# Patient Record
Sex: MALE | Race: WHITE | Employment: OTHER | URBAN - NONMETROPOLITAN AREA
[De-identification: names, ages, dates, MRNs, and addresses within clinical notes are randomized per-mention and may not be internally consistent; named-entity substitution may affect disease eponyms.]

---

## 2019-02-22 ENCOUNTER — OPTICAL OFFICE (OUTPATIENT)
Dept: URBAN - NONMETROPOLITAN AREA CLINIC 4 | Facility: CLINIC | Age: 49
Setting detail: OPHTHALMOLOGY
End: 2019-02-22
Payer: COMMERCIAL

## 2019-02-22 ENCOUNTER — DOCTOR'S OFFICE (OUTPATIENT)
Dept: URBAN - NONMETROPOLITAN AREA CLINIC 1 | Facility: CLINIC | Age: 49
Setting detail: OPHTHALMOLOGY
End: 2019-02-22
Payer: COMMERCIAL

## 2019-02-22 DIAGNOSIS — H52.03: ICD-10-CM

## 2019-02-22 DIAGNOSIS — H52.223: ICD-10-CM

## 2019-02-22 DIAGNOSIS — H52.4: ICD-10-CM

## 2019-02-22 DIAGNOSIS — H25.13: ICD-10-CM

## 2019-02-22 PROCEDURE — V2202 LENS SPHERE BIFOCAL 7.12-20.: HCPCS | Performed by: OPTOMETRIST

## 2019-02-22 PROCEDURE — V2784 LENS POLYCARB OR EQUAL: HCPCS | Performed by: OPTOMETRIST

## 2019-02-22 PROCEDURE — 92004 COMPRE OPH EXAM NEW PT 1/>: CPT | Performed by: OPTOMETRIST

## 2019-02-22 PROCEDURE — 92015 DETERMINE REFRACTIVE STATE: CPT | Performed by: OPTOMETRIST

## 2019-02-22 PROCEDURE — V2203 LENS SPHCYL BIFOCAL 4.00D/.1: HCPCS | Performed by: OPTOMETRIST

## 2019-02-22 PROCEDURE — V2020 VISION SVCS FRAMES PURCHASES: HCPCS | Performed by: OPTOMETRIST

## 2019-02-22 ASSESSMENT — REFRACTION_CURRENTRX
OD_OVR_VA: 20/
OS_VPRISM_DIRECTION: BF
OD_VPRISM_DIRECTION: BF
OS_OVR_VA: 20/
OS_SPHERE: +1.00
OD_OVR_VA: 20/
OS_OVR_VA: 20/
OS_OVR_VA: 20/
OS_CYLINDER: -0.50
OD_ADD: +1.75
OD_SPHERE: +1.25
OS_AXIS: 087
OD_AXIS: 180
OD_OVR_VA: 20/
OD_CYLINDER: 0.00
OS_ADD: +1.75

## 2019-02-22 ASSESSMENT — REFRACTION_AUTOREFRACTION
OS_AXIS: 082
OD_AXIS: 107
OD_CYLINDER: -0.25
OD_SPHERE: +2.00
OS_CYLINDER: -1.00
OS_SPHERE: +1.75

## 2019-02-22 ASSESSMENT — VISUAL ACUITY
OD_BCVA: 20/50
OS_BCVA: 20/50

## 2019-02-22 ASSESSMENT — REFRACTION_MANIFEST
OD_CYLINDER: -0.25
OD_VA3: 20/
OD_AXIS: 105
OD_VA2: 20/20
OS_AXIS: 090
OS_VA3: 20/
OS_ADD: +2.25
OU_VA: 20/
OS_CYLINDER: -0.75
OD_VA1: 20/25
OU_VA: 20/
OS_VA2: 20/
OS_VA2: 20/20
OS_VA1: 20/25
OD_VA3: 20/
OD_VA2: 20/
OD_VA1: 20/
OD_SPHERE: +1.75
OS_SPHERE: +1.50
OS_VA1: 20/
OS_VA3: 20/
OD_ADD: +2.25

## 2019-02-22 ASSESSMENT — SPHEQUIV_DERIVED
OD_SPHEQUIV: 1.875
OS_SPHEQUIV: 1.125
OD_SPHEQUIV: 1.625
OS_SPHEQUIV: 1.25

## 2020-01-22 ENCOUNTER — HOSPITAL ENCOUNTER (EMERGENCY)
Facility: HOSPITAL | Age: 50
Discharge: HOME | End: 2020-01-22
Attending: EMERGENCY MEDICINE | Admitting: EMERGENCY MEDICINE
Payer: COMMERCIAL

## 2020-01-22 ENCOUNTER — APPOINTMENT (EMERGENCY)
Dept: CT IMAGING | Facility: HOSPITAL | Age: 50
End: 2020-01-22
Payer: COMMERCIAL

## 2020-01-22 VITALS
OXYGEN SATURATION: 98 % | HEART RATE: 67 BPM | RESPIRATION RATE: 21 BRPM | DIASTOLIC BLOOD PRESSURE: 82 MMHG | WEIGHT: 225.09 LBS | SYSTOLIC BLOOD PRESSURE: 106 MMHG | TEMPERATURE: 98.6 F

## 2020-01-22 DIAGNOSIS — S16.1XXA ACUTE STRAIN OF NECK MUSCLE, INITIAL ENCOUNTER: Primary | ICD-10-CM

## 2020-01-22 PROCEDURE — 72125 CT NECK SPINE W/O DYE: CPT

## 2020-01-22 PROCEDURE — 99284 EMERGENCY DEPT VISIT MOD MDM: CPT | Performed by: EMERGENCY MEDICINE

## 2020-01-22 PROCEDURE — 96372 THER/PROPH/DIAG INJ SC/IM: CPT

## 2020-01-22 PROCEDURE — 99284 EMERGENCY DEPT VISIT MOD MDM: CPT

## 2020-01-22 RX ORDER — OXYCODONE HYDROCHLORIDE AND ACETAMINOPHEN 5; 325 MG/1; MG/1
1 TABLET ORAL EVERY 6 HOURS PRN
Qty: 12 TABLET | Refills: 0 | Status: SHIPPED | OUTPATIENT
Start: 2020-01-22 | End: 2020-01-25

## 2020-01-22 RX ORDER — HYDROMORPHONE HCL/PF 1 MG/ML
1 SYRINGE (ML) INJECTION ONCE
Status: COMPLETED | OUTPATIENT
Start: 2020-01-22 | End: 2020-01-22

## 2020-01-22 RX ORDER — OXYCODONE HYDROCHLORIDE AND ACETAMINOPHEN 5; 325 MG/1; MG/1
1 TABLET ORAL ONCE
Status: COMPLETED | OUTPATIENT
Start: 2020-01-22 | End: 2020-01-22

## 2020-01-22 RX ORDER — HYDROMORPHONE HCL/PF 1 MG/ML
1 SYRINGE (ML) INJECTION ONCE
Status: DISCONTINUED | OUTPATIENT
Start: 2020-01-22 | End: 2020-01-22

## 2020-01-22 RX ORDER — KETOROLAC TROMETHAMINE 30 MG/ML
15 INJECTION, SOLUTION INTRAMUSCULAR; INTRAVENOUS ONCE
Status: COMPLETED | OUTPATIENT
Start: 2020-01-22 | End: 2020-01-22

## 2020-01-22 RX ADMIN — HYDROMORPHONE HYDROCHLORIDE 1 MG: 1 INJECTION, SOLUTION INTRAMUSCULAR; INTRAVENOUS; SUBCUTANEOUS at 10:36

## 2020-01-22 RX ADMIN — HYDROMORPHONE HYDROCHLORIDE 1 MG: 1 INJECTION, SOLUTION INTRAMUSCULAR; INTRAVENOUS; SUBCUTANEOUS at 09:46

## 2020-01-22 RX ADMIN — KETOROLAC TROMETHAMINE 15 MG: 30 INJECTION, SOLUTION INTRAMUSCULAR at 09:47

## 2020-01-22 RX ADMIN — OXYCODONE HYDROCHLORIDE AND ACETAMINOPHEN 1 TABLET: 5; 325 TABLET ORAL at 11:54

## 2020-01-22 NOTE — ED PROVIDER NOTES
History  Chief Complaint   Patient presents with    Back Pain     Patient was moving bed last night and suddenly felt a "shooting pain" in back, hx of back surgery and fxs with stimulator placement, patient report numbness in left hand but denies numbness/ tingling elsewhere or changes in bowel/bladder habits     Patient was moving his bed last night when he felt a pull and pop in his neck  Patient has a TENS stimulator secondary to chronic neck pain  Patient is concerned that he may have pulled some wires loose from his neck  Also reports that he has pain shooting down his back and in addition he reports that he had some numbness/tingling in his left hand  Patient is right-hand dominant  Patient reports that he has not had that type of sensation in the past   He does however described a circumferential and it does not appear in a dermatomal distribution and extends from the mid to distal portion of his left forearm down completely into the hand  Patient has had no bowel or bladder incontinence any complains of no difficulty with ambulation other than discomfort  History provided by:  Patient and EMS personnel  Neck Pain   Pain location:  Generalized neck  Quality:  Aching  Pain radiates to:  L arm  Pain severity:  Severe  Pain is:  Unable to specify  Onset quality:  Sudden  Timing:  Constant  Progression:  Unchanged  Chronicity:  Recurrent  Context: lifting a heavy object    Context: not fall    Relieved by:  Nothing  Worsened by:  Position  Ineffective treatments:  None tried  Associated symptoms: numbness and tingling    Associated symptoms: no bladder incontinence, no bowel incontinence, no chest pain and no fever    Risk factors: hx of spinal trauma    Risk factors: no hx of head and neck radiation and no hx of osteoporosis        None       History reviewed  No pertinent past medical history  Past Surgical History:   Procedure Laterality Date    BACK SURGERY         History reviewed   No pertinent family history  I have reviewed and agree with the history as documented  Social History     Tobacco Use    Smoking status: Current Every Day Smoker     Packs/day: 0 50    Smokeless tobacco: Never Used   Substance Use Topics    Alcohol use: Never     Frequency: Never    Drug use: Not on file        Review of Systems   Constitutional: Negative  Negative for fever  HENT: Negative  Respiratory: Negative  Cardiovascular: Negative  Negative for chest pain  Gastrointestinal: Negative for blood in stool, bowel incontinence, constipation, diarrhea and nausea  Genitourinary: Negative  Negative for bladder incontinence, decreased urine volume, difficulty urinating, enuresis, flank pain, frequency, hematuria and urgency  Musculoskeletal: Positive for neck pain  Negative for arthralgias and myalgias  Neurological: Positive for tingling and numbness  Physical Exam  Physical Exam   Constitutional: He is oriented to person, place, and time  He appears well-developed and well-nourished  HENT:   Head: Normocephalic and atraumatic  Eyes: Pupils are equal, round, and reactive to light  EOM are normal    Neck: Neck supple  Muscular tenderness present  Decreased range of motion present  Pain with exam is disproportionate  At this point the lightest touch seems to elicit a severe response  Pulmonary/Chest: Effort normal and breath sounds normal  No stridor  No respiratory distress  He has no wheezes  He has no rhonchi  He has no rales  Abdominal: Soft  Normal appearance and bowel sounds are normal  He exhibits no mass  There is no hepatosplenomegaly  There is no tenderness  There is no rigidity, no rebound, no guarding and no CVA tenderness  No hernia  Lymphadenopathy:     He has no cervical adenopathy  Neurological: He is alert and oriented to person, place, and time  Skin: Skin is warm and dry  No rash noted  No pallor  Psychiatric: He has a normal mood and affect   His behavior is normal    Nursing note and vitals reviewed  Vital Signs  ED Triage Vitals [01/22/20 0927]   Temperature Pulse Respirations Blood Pressure SpO2   98 1 °F (36 7 °C) 79 20 108/83 97 %      Temp Source Heart Rate Source Patient Position - Orthostatic VS BP Location FiO2 (%)   Oral Monitor Lying Right arm --      Pain Score       --           Vitals:    01/22/20 0927 01/22/20 1100 01/22/20 1155   BP: 108/83 118/87 106/82   Pulse: 79 64 67   Patient Position - Orthostatic VS: Lying Sitting Sitting         Visual Acuity      ED Medications  Medications   HYDROmorphone (DILAUDID) injection 1 mg (1 mg Intramuscular Given 1/22/20 0946)   ketorolac (TORADOL) injection 15 mg (15 mg Intramuscular Given 1/22/20 0947)   HYDROmorphone (DILAUDID) injection 1 mg (1 mg Intramuscular Given 1/22/20 1036)   oxyCODONE-acetaminophen (PERCOCET) 5-325 mg per tablet 1 tablet (1 tablet Oral Given 1/22/20 1154)       Diagnostic Studies  Results Reviewed     None                 CT spine cervical without contrast   Final Result by Fior Borrero MD (01/22 1029)      No cervical spine fracture or traumatic malalignment  Workstation performed: [id number]                    Procedures  Procedures         ED Course  ED Course as of Jan 22 1232   Wed Jan 22, 2020   1129 No fracture identified  No traumatic malalignment and stimulators in place  MDM  Number of Diagnoses or Management Options  Acute strain of neck muscle, initial encounter:      Amount and/or Complexity of Data Reviewed  Tests in the radiology section of CPT®: ordered and reviewed          Disposition  Final diagnoses:   Acute strain of neck muscle, initial encounter     Time reflects when diagnosis was documented in both MDM as applicable and the Disposition within this note     Time User Action Codes Description Comment    1/22/2020 11:43 AM Denise Hays Add [S16  1XXA] Acute strain of neck muscle, initial encounter ED Disposition     ED Disposition Condition Date/Time Comment    Discharge Stable Wed Jan 22, 2020 11:41 AM Taya Hoover discharge to home/self care  Follow-up Information     Follow up With Specialties Details Why [address] [facility],[address] [place] [address],  Family Medicine   [address] [place] [address] Raymondnhflavio  [phone]            Discharge Medication List as of 1/22/2020 11:45 AM      START taking these medications    Details   oxyCODONE-acetaminophen (PERCOCET) 5-325 mg per tablet Take 1 tablet by mouth every 6 (six) hours as needed for moderate pain for up to 3 daysMax Daily Amount: 4 tablets, Starting Wed 1/22/2020, Until Sat 1/25/2020, Normal           No discharge procedures on file      ED Provider  Electronically Signed by           Dash Turner DO  01/22/20 2079

## 2020-02-24 ENCOUNTER — DOCTOR'S OFFICE (OUTPATIENT)
Dept: URBAN - NONMETROPOLITAN AREA CLINIC 1 | Facility: CLINIC | Age: 50
Setting detail: OPHTHALMOLOGY
End: 2020-02-24
Payer: COMMERCIAL

## 2020-02-24 ENCOUNTER — OPTICAL OFFICE (OUTPATIENT)
Dept: URBAN - NONMETROPOLITAN AREA CLINIC 4 | Facility: CLINIC | Age: 50
Setting detail: OPHTHALMOLOGY
End: 2020-02-24
Payer: COMMERCIAL

## 2020-02-24 DIAGNOSIS — H52.03: ICD-10-CM

## 2020-02-24 DIAGNOSIS — H25.13: ICD-10-CM

## 2020-02-24 DIAGNOSIS — H52.223: ICD-10-CM

## 2020-02-24 DIAGNOSIS — H52.4: ICD-10-CM

## 2020-02-24 PROCEDURE — 92015 DETERMINE REFRACTIVE STATE: CPT | Performed by: OPTOMETRIST

## 2020-02-24 PROCEDURE — V2202 LENS SPHERE BIFOCAL 7.12-20.: HCPCS | Performed by: OPTOMETRIST

## 2020-02-24 PROCEDURE — 92014 COMPRE OPH EXAM EST PT 1/>: CPT | Performed by: OPTOMETRIST

## 2020-02-24 PROCEDURE — V2020 VISION SVCS FRAMES PURCHASES: HCPCS | Performed by: OPTOMETRIST

## 2020-02-24 PROCEDURE — V2203 LENS SPHCYL BIFOCAL 4.00D/.1: HCPCS | Performed by: OPTOMETRIST

## 2020-02-24 PROCEDURE — V2784 LENS POLYCARB OR EQUAL: HCPCS | Performed by: OPTOMETRIST

## 2020-02-24 ASSESSMENT — REFRACTION_AUTOREFRACTION
OS_AXIS: 084
OD_CYLINDER: -0.50
OD_AXIS: 107
OS_SPHERE: +2.00
OD_SPHERE: +2.00
OS_CYLINDER: -1.25

## 2020-02-24 ASSESSMENT — VISUAL ACUITY
OS_BCVA: 20/40
OD_BCVA: 20/50-1

## 2020-02-24 ASSESSMENT — REFRACTION_CURRENTRX
OD_CYLINDER: 0.00
OD_OVR_VA: 20/
OS_AXIS: 090
OS_CYLINDER: -0.50
OS_SPHERE: +1.00
OS_OVR_VA: 20/
OD_ADD: +1.75
OD_AXIS: 180
OS_ADD: +1.75
OS_VPRISM_DIRECTION: BF
OD_SPHERE: +1.25
OD_VPRISM_DIRECTION: BF

## 2020-02-24 ASSESSMENT — REFRACTION_MANIFEST
OS_VA1: 20/25
OS_CYLINDER: -1.00
OS_SPHERE: +1.50
OS_AXIS: 090
OD_CYLINDER: -0.50
OD_VA1: 20/25
OD_VA2: 20/20
OD_ADD: +2.25
OS_VA2: 20/20
OS_ADD: +2.25
OD_AXIS: 105
OD_SPHERE: +1.75

## 2020-02-24 ASSESSMENT — CONFRONTATIONAL VISUAL FIELD TEST (CVF)
OD_FINDINGS: FULL
OS_FINDINGS: FULL

## 2020-02-24 ASSESSMENT — SPHEQUIV_DERIVED
OD_SPHEQUIV: 1.75
OD_SPHEQUIV: 1.5
OS_SPHEQUIV: 1.375
OS_SPHEQUIV: 1

## 2022-08-11 DIAGNOSIS — R22.2 LOCALIZED SWELLING, MASS AND LUMP, TRUNK: ICD-10-CM

## 2022-08-11 DIAGNOSIS — K42.9 UMBILICAL HERNIA WITHOUT OBSTRUCTION OR GANGRENE: ICD-10-CM

## 2022-08-13 ENCOUNTER — HOSPITAL ENCOUNTER (OUTPATIENT)
Dept: ULTRASOUND IMAGING | Facility: HOSPITAL | Age: 52
Discharge: HOME | End: 2022-08-13
Payer: COMMERCIAL

## 2022-08-13 DIAGNOSIS — R22.2 LOCALIZED SWELLING, MASS AND LUMP, TRUNK: ICD-10-CM

## 2022-08-13 DIAGNOSIS — K42.9 UMBILICAL HERNIA WITHOUT OBSTRUCTION OR GANGRENE: ICD-10-CM

## 2022-08-13 PROCEDURE — 76705 ECHO EXAM OF ABDOMEN: CPT

## 2022-08-23 ENCOUNTER — DOCTOR'S OFFICE (OUTPATIENT)
Dept: URBAN - NONMETROPOLITAN AREA CLINIC 1 | Facility: CLINIC | Age: 52
Setting detail: OPHTHALMOLOGY
End: 2022-08-23
Payer: COMMERCIAL

## 2022-08-23 DIAGNOSIS — H25.13: ICD-10-CM

## 2022-08-23 DIAGNOSIS — H52.4: ICD-10-CM

## 2022-08-23 DIAGNOSIS — H52.03: ICD-10-CM

## 2022-08-23 PROCEDURE — 92015 DETERMINE REFRACTIVE STATE: CPT | Performed by: OPTOMETRIST

## 2022-08-23 PROCEDURE — 92014 COMPRE OPH EXAM EST PT 1/>: CPT | Performed by: OPTOMETRIST

## 2022-08-23 ASSESSMENT — VISUAL ACUITY
OD_BCVA: 20/50
OS_BCVA: 20/50

## 2022-08-23 ASSESSMENT — SPHEQUIV_DERIVED
OD_SPHEQUIV: 1.75
OD_SPHEQUIV: 1.75
OS_SPHEQUIV: 1.375
OS_SPHEQUIV: 1.375

## 2022-08-23 ASSESSMENT — REFRACTION_CURRENTRX
OS_OVR_VA: 20/
OS_SPHERE: +1.50
OS_ADD: +2.25
OS_CYLINDER: -1.00
OS_AXIS: 091
OD_CYLINDER: -0.50
OD_SPHERE: +1.75
OD_OVR_VA: 20/
OD_AXIS: 105
OD_ADD: +2.25

## 2022-08-23 ASSESSMENT — REFRACTION_AUTOREFRACTION
OS_SPHERE: +2.00
OD_CYLINDER: -0.50
OD_SPHERE: +2.00
OD_AXIS: 087
OS_AXIS: 086
OS_CYLINDER: -1.25

## 2022-08-23 ASSESSMENT — REFRACTION_MANIFEST
OD_AXIS: 090
OD_VA2: 20/25-2
OS_CYLINDER: -1.25
OS_ADD: +2.50
OS_SPHERE: +2.00
OD_CYLINDER: -0.50
OD_VA1: 20/25-2
OD_SPHERE: +2.00
OD_ADD: +2.50
OS_AXIS: 085
OS_VA2: 20/30-2
OS_VA1: 20/30-2

## 2022-08-23 ASSESSMENT — TONOMETRY
OD_IOP_MMHG: 14
OS_IOP_MMHG: 14

## 2022-08-23 ASSESSMENT — CONFRONTATIONAL VISUAL FIELD TEST (CVF)
OS_FINDINGS: FULL
OD_FINDINGS: FULL

## 2022-09-01 ENCOUNTER — OPTICAL OFFICE (OUTPATIENT)
Dept: URBAN - NONMETROPOLITAN AREA CLINIC 4 | Facility: CLINIC | Age: 52
Setting detail: OPHTHALMOLOGY
End: 2022-09-01
Payer: COMMERCIAL

## 2022-09-01 DIAGNOSIS — H52.223: ICD-10-CM

## 2022-09-01 PROCEDURE — V2784 LENS POLYCARB OR EQUAL: HCPCS | Performed by: OPTOMETRIST

## 2022-09-01 PROCEDURE — V2203 LENS SPHCYL BIFOCAL 4.00D/.1: HCPCS | Performed by: OPTOMETRIST

## 2022-09-01 PROCEDURE — V2202 LENS SPHERE BIFOCAL 7.12-20.: HCPCS | Performed by: OPTOMETRIST

## 2022-09-01 PROCEDURE — V2744 TINT PHOTOCHROMATIC LENS/ES: HCPCS | Performed by: OPTOMETRIST

## 2022-09-01 PROCEDURE — V2020 VISION SVCS FRAMES PURCHASES: HCPCS | Performed by: OPTOMETRIST

## 2022-09-13 ENCOUNTER — HOSPITAL ENCOUNTER (OUTPATIENT)
Dept: RADIOLOGY | Facility: HOSPITAL | Age: 52
Discharge: HOME | End: 2022-09-13
Attending: SURGERY
Payer: COMMERCIAL

## 2022-09-13 DIAGNOSIS — D17.1 LIPOMA OF TORSO: ICD-10-CM

## 2022-09-13 DIAGNOSIS — K42.9 UMBILICAL HERNIA WITHOUT OBSTRUCTION AND WITHOUT GANGRENE: ICD-10-CM

## 2022-09-13 PROCEDURE — 71046 X-RAY EXAM CHEST 2 VIEWS: CPT

## 2022-09-28 DIAGNOSIS — T65.291A TOXIC EFFECT OF TOBACCO AND NICOTINE, ACCIDENTAL OR UNINTENTIONAL, INITIAL ENCOUNTER: Primary | ICD-10-CM

## 2022-09-28 RX ORDER — ASPIRIN 81 MG/1
81 TABLET, CHEWABLE ORAL DAILY
COMMUNITY

## 2022-09-28 RX ORDER — FLECAINIDE ACETATE 50 MG/1
50 TABLET ORAL 2 TIMES DAILY
COMMUNITY

## 2022-09-28 RX ORDER — METOPROLOL SUCCINATE 50 MG/1
50 TABLET, EXTENDED RELEASE ORAL DAILY
COMMUNITY

## 2022-09-28 RX ORDER — COVID-19 ANTIGEN TEST
1 KIT MISCELLANEOUS AS NEEDED
COMMUNITY
End: 2022-10-05

## 2022-09-28 RX ORDER — ROSUVASTATIN CALCIUM 40 MG/1
40 TABLET, COATED ORAL DAILY
COMMUNITY

## 2022-09-28 RX ORDER — CALCIUM CARBONATE 750 MG/1
1 TABLET, CHEWABLE ORAL DAILY
COMMUNITY

## 2022-09-28 RX ORDER — EZETIMIBE 10 MG/1
10 TABLET ORAL DAILY
COMMUNITY

## 2022-09-28 NOTE — PRE-PROCEDURE INSTRUCTIONS
Pre-Surgery Instructions:   Medication Instructions    aspirin 81 mg chewable tablet Hold day of surgery   ezetimibe (ZETIA) 10 mg tablet Take day of surgery   flecainide (TAMBOCOR) 50 mg tablet Take day of surgery   metoprolol succinate (TOPROL-XL) 50 mg 24 hr tablet Take day of surgery   Naproxen Sodium (Aleve) 220 MG CAPS Stop taking 3 days prior to surgery   rosuvastatin (CRESTOR) 40 MG tablet Take day of surgery  See above    Pt was instructed to take the am meds with a small sip of water

## 2022-09-29 ENCOUNTER — ANESTHESIA EVENT (OUTPATIENT)
Dept: PERIOP | Facility: HOSPITAL | Age: 52
End: 2022-09-29
Payer: COMMERCIAL

## 2022-10-03 NOTE — ANESTHESIA PREPROCEDURE EVALUATION
Procedure:  ROBOTIC ASSISTED LAPAROSCOPIC UMBILICAL HERNIA REPAIR WITH MESH (N/A Abdomen)  OPEN UMBILICAL HERNIA REPAIR (N/A Abdomen)  ABDOMINAL WALL LIPOMA EXCISION (N/A Abdomen)    ECG: SR with PVCS and QT wnl    Prior Ziopatch showed 10% afib burden with RVR did not wish to follow up with EP - Seen by cardiologist, ECG showed QT wnl and no further w/u needed prior to surgery   Afib - flecainide and metoprolol  HLD - statin  Albuterol  SCS  Hx cervical spine fusion     States he can walk 6-7 blocks until he has some dyspnea but denies chest pain or pressure  States he will randomly have chest pain not associated with exertion but at times will feel heart racing  Last episode was last night  He did take his metoprolol and flecainide this am   I did discuss with him the risk and possibility of having Afib RVR intraop and the potential for: aborted procedure if unable to establish rate control, secondly possibly requiring admission post operative if rate not controlled and the potential for stress on his heart secondary to rapid rate  He otherwise Denies the following: asthma, COPD, KADI, stroke/TIA, seizure      Physical Exam    Airway    Mallampati score: II  TM Distance: >3 FB       Dental   upper dentures,     Cardiovascular      Pulmonary      Other Findings        Anesthesia Plan  ASA Score- 3     Anesthesia Type- general with ASA Monitors  Additional Monitors:   Airway Plan: ETT  Plan Factors-Exercise tolerance (METS): >4 METS  Chart reviewed  EKG reviewed  Existing labs reviewed  Patient summary reviewed  Patient is a current smoker  Obstructive sleep apnea risk education given perioperatively  Induction- intravenous  Postoperative Plan-     Informed Consent- Anesthetic plan and risks discussed with patient  I personally reviewed this patient with the CRNA  Discussed and agreed on the Anesthesia Plan with the CRNA  Marika Chawla

## 2022-10-04 ENCOUNTER — HOSPITAL ENCOUNTER (OUTPATIENT)
Facility: HOSPITAL | Age: 52
Setting detail: OUTPATIENT SURGERY
Discharge: HOME | End: 2022-10-05
Attending: SURGERY | Admitting: SURGERY
Payer: COMMERCIAL

## 2022-10-04 ENCOUNTER — ANESTHESIA (OUTPATIENT)
Dept: PERIOP | Facility: HOSPITAL | Age: 52
End: 2022-10-04
Payer: COMMERCIAL

## 2022-10-04 DIAGNOSIS — D17.1 LIPOMA OF ABDOMINAL WALL: ICD-10-CM

## 2022-10-04 DIAGNOSIS — K42.9 UMBILICAL HERNIA WITHOUT OBSTRUCTION AND WITHOUT GANGRENE: ICD-10-CM

## 2022-10-04 DIAGNOSIS — K42.9 UMBILICAL HERNIA WITHOUT OBSTRUCTION OR GANGRENE: Primary | ICD-10-CM

## 2022-10-04 DIAGNOSIS — I49.3 PVC (PREMATURE VENTRICULAR CONTRACTION): ICD-10-CM

## 2022-10-04 LAB — GLUCOSE SERPL-MCNC: 106 MG/DL (ref 65–140)

## 2022-10-04 PROCEDURE — 82948 REAGENT STRIP/BLOOD GLUCOSE: CPT

## 2022-10-04 PROCEDURE — 49652 PR LAP, VENTRAL HERNIA REPAIR,REDUCIBLE: CPT | Performed by: PHYSICIAN ASSISTANT

## 2022-10-04 PROCEDURE — 93005 ELECTROCARDIOGRAM TRACING: CPT

## 2022-10-04 PROCEDURE — 88304 TISSUE EXAM BY PATHOLOGIST: CPT | Performed by: PATHOLOGY

## 2022-10-04 PROCEDURE — C1781 MESH (IMPLANTABLE): HCPCS | Performed by: SURGERY

## 2022-10-04 PROCEDURE — 11406 EXC TR-EXT B9+MARG >4.0 CM: CPT | Performed by: PHYSICIAN ASSISTANT

## 2022-10-04 DEVICE — VENTRALIGHT ST MESH WITH ECHO 2 POSITIONING SYSTEM 11 CM (4.5)" CIRCLE
Type: IMPLANTABLE DEVICE | Status: FUNCTIONAL
Brand: VENTRALIGHT ST MESH WITH ECHO 2 POSITIONING SYSTEM

## 2022-10-04 RX ORDER — EPHEDRINE SULFATE 50 MG/ML
INJECTION INTRAVENOUS AS NEEDED
Status: DISCONTINUED | OUTPATIENT
Start: 2022-10-04 | End: 2022-10-04

## 2022-10-04 RX ORDER — ROCURONIUM BROMIDE 10 MG/ML
INJECTION, SOLUTION INTRAVENOUS AS NEEDED
Status: DISCONTINUED | OUTPATIENT
Start: 2022-10-04 | End: 2022-10-04

## 2022-10-04 RX ORDER — ONDANSETRON 2 MG/ML
4 INJECTION INTRAMUSCULAR; INTRAVENOUS ONCE AS NEEDED
Status: DISCONTINUED | OUTPATIENT
Start: 2022-10-04 | End: 2022-10-04

## 2022-10-04 RX ORDER — SODIUM CHLORIDE, SODIUM LACTATE, POTASSIUM CHLORIDE, CALCIUM CHLORIDE 600; 310; 30; 20 MG/100ML; MG/100ML; MG/100ML; MG/100ML
INJECTION, SOLUTION INTRAVENOUS CONTINUOUS PRN
Status: DISCONTINUED | OUTPATIENT
Start: 2022-10-04 | End: 2022-10-04

## 2022-10-04 RX ORDER — OXYCODONE HYDROCHLORIDE AND ACETAMINOPHEN 5; 325 MG/1; MG/1
1 TABLET ORAL EVERY 4 HOURS PRN
Status: DISCONTINUED | OUTPATIENT
Start: 2022-10-04 | End: 2022-10-05 | Stop reason: HOSPADM

## 2022-10-04 RX ORDER — ATORVASTATIN CALCIUM 40 MG/1
40 TABLET, FILM COATED ORAL
Status: DISCONTINUED | OUTPATIENT
Start: 2022-10-04 | End: 2022-10-05 | Stop reason: HOSPADM

## 2022-10-04 RX ORDER — CEFAZOLIN SODIUM 2 G/50ML
2000 SOLUTION INTRAVENOUS
Status: COMPLETED | OUTPATIENT
Start: 2022-10-04 | End: 2022-10-04

## 2022-10-04 RX ORDER — MAGNESIUM SULFATE 1 G/100ML
1 INJECTION INTRAVENOUS ONCE
Status: COMPLETED | OUTPATIENT
Start: 2022-10-04 | End: 2022-10-04

## 2022-10-04 RX ORDER — EZETIMIBE 10 MG/1
10 TABLET ORAL DAILY
Status: DISCONTINUED | OUTPATIENT
Start: 2022-10-05 | End: 2022-10-05 | Stop reason: HOSPADM

## 2022-10-04 RX ORDER — LIDOCAINE HYDROCHLORIDE 10 MG/ML
INJECTION, SOLUTION EPIDURAL; INFILTRATION; INTRACAUDAL; PERINEURAL AS NEEDED
Status: DISCONTINUED | OUTPATIENT
Start: 2022-10-04 | End: 2022-10-04

## 2022-10-04 RX ORDER — LIDOCAINE HYDROCHLORIDE AND EPINEPHRINE 10; 10 MG/ML; UG/ML
INJECTION, SOLUTION INFILTRATION; PERINEURAL AS NEEDED
Status: DISCONTINUED | OUTPATIENT
Start: 2022-10-04 | End: 2022-10-04 | Stop reason: HOSPADM

## 2022-10-04 RX ORDER — DEXAMETHASONE SODIUM PHOSPHATE 10 MG/ML
INJECTION, SOLUTION INTRAMUSCULAR; INTRAVENOUS AS NEEDED
Status: DISCONTINUED | OUTPATIENT
Start: 2022-10-04 | End: 2022-10-04

## 2022-10-04 RX ORDER — ACETAMINOPHEN 325 MG/1
650 TABLET ORAL EVERY 6 HOURS PRN
Status: DISCONTINUED | OUTPATIENT
Start: 2022-10-04 | End: 2022-10-05 | Stop reason: HOSPADM

## 2022-10-04 RX ORDER — FENTANYL CITRATE/PF 50 MCG/ML
25 SYRINGE (ML) INJECTION
Status: DISCONTINUED | OUTPATIENT
Start: 2022-10-04 | End: 2022-10-04

## 2022-10-04 RX ORDER — HYDROMORPHONE HCL/PF 1 MG/ML
0.5 SYRINGE (ML) INJECTION
Status: DISCONTINUED | OUTPATIENT
Start: 2022-10-04 | End: 2022-10-04

## 2022-10-04 RX ORDER — FENTANYL CITRATE 50 UG/ML
INJECTION, SOLUTION INTRAMUSCULAR; INTRAVENOUS AS NEEDED
Status: DISCONTINUED | OUTPATIENT
Start: 2022-10-04 | End: 2022-10-04

## 2022-10-04 RX ORDER — ONDANSETRON 2 MG/ML
INJECTION INTRAMUSCULAR; INTRAVENOUS AS NEEDED
Status: DISCONTINUED | OUTPATIENT
Start: 2022-10-04 | End: 2022-10-04

## 2022-10-04 RX ORDER — PROPOFOL 10 MG/ML
INJECTION, EMULSION INTRAVENOUS AS NEEDED
Status: DISCONTINUED | OUTPATIENT
Start: 2022-10-04 | End: 2022-10-04

## 2022-10-04 RX ORDER — MIDAZOLAM HYDROCHLORIDE 2 MG/2ML
INJECTION, SOLUTION INTRAMUSCULAR; INTRAVENOUS AS NEEDED
Status: DISCONTINUED | OUTPATIENT
Start: 2022-10-04 | End: 2022-10-04

## 2022-10-04 RX ORDER — DOCUSATE SODIUM 100 MG/1
100 CAPSULE, LIQUID FILLED ORAL 2 TIMES DAILY
Status: DISCONTINUED | OUTPATIENT
Start: 2022-10-04 | End: 2022-10-05 | Stop reason: HOSPADM

## 2022-10-04 RX ORDER — OXYCODONE HYDROCHLORIDE AND ACETAMINOPHEN 5; 325 MG/1; MG/1
2 TABLET ORAL EVERY 4 HOURS PRN
Status: DISCONTINUED | OUTPATIENT
Start: 2022-10-04 | End: 2022-10-05 | Stop reason: HOSPADM

## 2022-10-04 RX ORDER — KETOROLAC TROMETHAMINE 30 MG/ML
INJECTION, SOLUTION INTRAMUSCULAR; INTRAVENOUS AS NEEDED
Status: DISCONTINUED | OUTPATIENT
Start: 2022-10-04 | End: 2022-10-04

## 2022-10-04 RX ORDER — NICOTINE 21 MG/24HR
1 PATCH, TRANSDERMAL 24 HOURS TRANSDERMAL DAILY
Status: DISCONTINUED | OUTPATIENT
Start: 2022-10-05 | End: 2022-10-04 | Stop reason: SDUPTHER

## 2022-10-04 RX ORDER — METOPROLOL SUCCINATE 50 MG/1
50 TABLET, EXTENDED RELEASE ORAL DAILY
Status: DISCONTINUED | OUTPATIENT
Start: 2022-10-05 | End: 2022-10-05 | Stop reason: HOSPADM

## 2022-10-04 RX ORDER — ALBUTEROL SULFATE 2.5 MG/3ML
2.5 SOLUTION RESPIRATORY (INHALATION) ONCE AS NEEDED
Status: COMPLETED | OUTPATIENT
Start: 2022-10-04 | End: 2022-10-04

## 2022-10-04 RX ORDER — FLECAINIDE ACETATE 50 MG/1
50 TABLET ORAL 2 TIMES DAILY
Status: DISCONTINUED | OUTPATIENT
Start: 2022-10-04 | End: 2022-10-05 | Stop reason: HOSPADM

## 2022-10-04 RX ORDER — NICOTINE 21 MG/24HR
14 PATCH, TRANSDERMAL 24 HOURS TRANSDERMAL DAILY
Status: DISCONTINUED | OUTPATIENT
Start: 2022-10-05 | End: 2022-10-05 | Stop reason: HOSPADM

## 2022-10-04 RX ORDER — ONDANSETRON 2 MG/ML
4 INJECTION INTRAMUSCULAR; INTRAVENOUS EVERY 6 HOURS PRN
Status: DISCONTINUED | OUTPATIENT
Start: 2022-10-04 | End: 2022-10-05 | Stop reason: HOSPADM

## 2022-10-04 RX ADMIN — OXYCODONE HYDROCHLORIDE AND ACETAMINOPHEN 2 TABLET: 5; 325 TABLET ORAL at 17:15

## 2022-10-04 RX ADMIN — SODIUM CHLORIDE, SODIUM LACTATE, POTASSIUM CHLORIDE, AND CALCIUM CHLORIDE: .6; .31; .03; .02 INJECTION, SOLUTION INTRAVENOUS at 12:48

## 2022-10-04 RX ADMIN — MIDAZOLAM 2 MG: 1 INJECTION INTRAMUSCULAR; INTRAVENOUS at 12:48

## 2022-10-04 RX ADMIN — DOCUSATE SODIUM 100 MG: 100 CAPSULE ORAL at 17:15

## 2022-10-04 RX ADMIN — ROCURONIUM BROMIDE 10 MG: 10 INJECTION, SOLUTION INTRAVENOUS at 14:00

## 2022-10-04 RX ADMIN — Medication 100 MCG: at 13:03

## 2022-10-04 RX ADMIN — LIDOCAINE HYDROCHLORIDE 50 MG: 10 INJECTION, SOLUTION EPIDURAL; INFILTRATION; INTRACAUDAL at 12:53

## 2022-10-04 RX ADMIN — SODIUM CHLORIDE, SODIUM LACTATE, POTASSIUM CHLORIDE, AND CALCIUM CHLORIDE: .6; .31; .03; .02 INJECTION, SOLUTION INTRAVENOUS at 14:15

## 2022-10-04 RX ADMIN — DEXAMETHASONE SODIUM PHOSPHATE 8 MG: 10 INJECTION, SOLUTION INTRAMUSCULAR; INTRAVENOUS at 12:53

## 2022-10-04 RX ADMIN — OXYCODONE HYDROCHLORIDE AND ACETAMINOPHEN 1 TABLET: 5; 325 TABLET ORAL at 20:29

## 2022-10-04 RX ADMIN — Medication 100 MCG: at 13:25

## 2022-10-04 RX ADMIN — Medication 100 MCG: at 14:11

## 2022-10-04 RX ADMIN — SUGAMMADEX 200 MG: 100 INJECTION, SOLUTION INTRAVENOUS at 14:39

## 2022-10-04 RX ADMIN — FENTANYL CITRATE 50 MCG: 50 INJECTION INTRAMUSCULAR; INTRAVENOUS at 13:25

## 2022-10-04 RX ADMIN — FENTANYL CITRATE 50 MCG: 50 INJECTION INTRAMUSCULAR; INTRAVENOUS at 12:53

## 2022-10-04 RX ADMIN — Medication 100 MCG: at 13:09

## 2022-10-04 RX ADMIN — ALBUTEROL SULFATE 2.5 MG: 2.5 SOLUTION RESPIRATORY (INHALATION) at 14:57

## 2022-10-04 RX ADMIN — ROCURONIUM BROMIDE 50 MG: 10 INJECTION, SOLUTION INTRAVENOUS at 12:53

## 2022-10-04 RX ADMIN — KETOROLAC TROMETHAMINE 30 MG: 30 INJECTION, SOLUTION INTRAMUSCULAR at 14:29

## 2022-10-04 RX ADMIN — PROPOFOL 200 MG: 10 INJECTION, EMULSION INTRAVENOUS at 12:53

## 2022-10-04 RX ADMIN — EPHEDRINE SULFATE 10 MG: 50 INJECTION, SOLUTION INTRAVENOUS at 14:15

## 2022-10-04 RX ADMIN — Medication 200 MCG: at 13:58

## 2022-10-04 RX ADMIN — HYDROMORPHONE HYDROCHLORIDE 0.5 MG: 1 INJECTION, SOLUTION INTRAMUSCULAR; INTRAVENOUS; SUBCUTANEOUS at 15:54

## 2022-10-04 RX ADMIN — MAGNESIUM SULFATE HEPTAHYDRATE 1 G: 1 INJECTION, SOLUTION INTRAVENOUS at 16:16

## 2022-10-04 RX ADMIN — ONDANSETRON 4 MG: 2 INJECTION INTRAMUSCULAR; INTRAVENOUS at 13:09

## 2022-10-04 RX ADMIN — Medication 100 MCG: at 13:15

## 2022-10-04 RX ADMIN — CEFAZOLIN SODIUM 2000 MG: 2 SOLUTION INTRAVENOUS at 12:48

## 2022-10-04 RX ADMIN — EPHEDRINE SULFATE 10 MG: 50 INJECTION, SOLUTION INTRAVENOUS at 14:05

## 2022-10-04 RX ADMIN — FENTANYL CITRATE 25 MCG: 0.05 INJECTION, SOLUTION INTRAMUSCULAR; INTRAVENOUS at 15:39

## 2022-10-04 RX ADMIN — FENTANYL CITRATE 25 MCG: 0.05 INJECTION, SOLUTION INTRAMUSCULAR; INTRAVENOUS at 15:04

## 2022-10-04 RX ADMIN — FLECAINIDE ACETATE 50 MG: 50 TABLET ORAL at 17:15

## 2022-10-04 RX ADMIN — ROCURONIUM BROMIDE 20 MG: 10 INJECTION, SOLUTION INTRAVENOUS at 13:24

## 2022-10-04 NOTE — ANESTHESIA POSTPROCEDURE EVALUATION
Post-Op Assessment Note    CV Status:  Stable  Pain Score: 0    Pain management: adequate  Multimodal analgesia used between 6 hours prior to anesthesia start to PACU discharge    Mental Status:  Awake and agitated (moaning but shakes head "no" to pain)   Hydration Status:  Euvolemic   PONV Controlled:  Controlled   Airway Patency:  Patent   Two or more mitigation strategies used for obstructive sleep apnea   Post Op Vitals Reviewed: Yes      Staff: CRNA         No complications documented      BP   130/73   Temp 98 1 °F (36 7 °C) (10/04/22 1449)    Pulse 60 (10/04/22 1449)   Resp 18 (10/04/22 1449)    SpO2 99 % (10/04/22 1449)

## 2022-10-04 NOTE — QUICK NOTE
I was asked to come to the bedside to evaluate patient for concerns with PVCs  Patient underwent hernia repair today and post operatively had some PVCs  This is a known condition for patient  He is on BB as an OP as well as flecainide for underlying PAF  I did talk to the patient and he reports that currently his stomach hurts and he is nauseas but he has no active chest pain, sob or palpitations  On monitor he does have isolated PVCs but no evidence of VT  Admitting service may up titrate Toprol xl to 50 mg PO BID if having significant PVCs overnight if BP will allow  No further cardiac intervention is indicated at this time in the absence of further cardiac symptoms  Will tele check patient tomorrow per request of anesthesia team  I did ask anesthesia if they would like to perform a formal consult but they did decline this measure given this is a known condition for patient       Opal Speaker SANIYA

## 2022-10-04 NOTE — OP NOTE
OPERATIVE REPORT  PATIENT NAME: Henrik Anthony    :  1970  MRN: [id number]  Pt Location: [facility] OR ROOM 01    SURGERY DATE: 10/4/2022    Surgeon(s) and Role:     * Fausto Aranda DO - Primary     * Di Jacobson PA-C - Assisting     * Cait Dunaway [id number] - did not scrub  Preop Diagnosis:  Umbilical hernia without obstruction or gangrene [K42 9]  Lipoma of abdominal wall [D17 1]    Post-Op Diagnosis Codes:     * Umbilical hernia without obstruction or gangrene [K42 9]     * Lipoma of abdominal wall [D17 1] - measuring 4 cm x 3 cm x 1 cm    Procedure(s) (LRB):  ROBOTIC ASSISTED LAPAROSCOPIC UMBILICAL HERNIA REPAIR WITH MESH (N/A)  ABDOMINAL WALL LIPOMA EXCISION (N/A)    Specimen(s):  ID Type Source Tests Collected by Time Destination   1 : Abdominal wall lipoma Tissue Soft Tissue, Lipoma TISSUE EXAM Fausto Aranda DO 10/4/2022  2:29 PM        Estimated Blood Loss:   Minimal    Drains:  NG/OG/Enteral Tube Orogastric 18 Fr Center mouth (Active)   Number of days: 0       Anesthesia Type:   General    Operative Indications:  Umbilical hernia without obstruction or gangrene [K42 9]  Lipoma of abdominal wall [D17 1]      Operative Findings:  2 cm umbilical hernia defect containing pre-peritoneal fat  Left abdominal wall subcutaneous fatty mass  Complications:   None    Procedure and Technique:    Patient was taken to the operating room and correctly identified and the procedure was verified  They were placed in the supine position on the operating room table and general anesthesia was administered  The patient received preoperative antibiotics  The abdomen was prepped and draped in a sterile fashion  A time out was held and the above information was confirmed  The abdomen was entered in the left upper quadrant under Optiview technique with a 12 mm trocar and insufflated to 15 mmhg   An additional 12 mm trocar was placed in the left lateral abdomen and an 8 mm robotic trocar in the left lower abdomen triangulating toward the hernia defect  The robot was then docked according to [facility] [employer] specifications  Robotic scissors were used to dissect off the pre-peritoneal fat and the hernia was completely reduced  It contained pre-peritoneal fat  The hernia defect measured 2 cm  The fascia was then closed with 0 V-lock suture in a running fashion  A 11 cm [employer] Vetnralight ST mesh with echo positioning system was then placed into the abdomen and secured circumferentially with a 2-0 V-lock suture  The mesh lay flat without wrinkles  The needles and the positioning system were removed from the abdomen and the Robot was undocked  The 12 mm trocar sites were closed with 0 vicryl transfascial sutures  At this time attention was turned to the left lateral abdominal wall soft tissue mass  The skin was anestehtized with 1% lidocaine with epi and a 3 cm incision was made transversely over the palpable mass  The subcutaneous tissue was dissected down with electrocautery and the specimen was completely excised and measured 4 cm x 3 cm x 1 cm  The wound was irrigated with sterile saline  The skin was anesthetized and closed with 4-0 monocryl suture and covered with surgical glue  This marked the end of the procedure  All needle, instrument and sponge counts were correct times two  The patient awoke from anesthesia and was taken to recovery in stable condition        I was present for the entire procedure and A physician assistant was required during the procedure for retraction tissue handling,dissection and suturing    Patient Disposition:  PACU         SIGNATURE: Liliane Gomez DO  DATE: October 4, 2022  TIME: 2:33 PM

## 2022-10-04 NOTE — INTERVAL H&P NOTE
H&P reviewed  After examining the patient I find no changes in the patients condition since the H&P had been written      Vitals:    10/04/22 1019   BP: 121/75   Pulse: 69   Resp: 18   Temp: 98 2 °F (36 8 °C)   SpO2: 95%

## 2022-10-04 NOTE — DISCHARGE INSTRUCTIONS
Discharge Date:  10/4/2022  Procedure:  Procedure(s):  ROBOTIC ASSISTED LAPAROSCOPIC UMBILICAL HERNIA REPAIR WITH MESH  ABDOMINAL WALL LIPOMA EXCISION    Surgeon:  Sierra García, DO    Please contact Dr Vandana Lutz office at [phone] with any concerns or questions  The information below provides you with the instructions and the list of medications you need to be taking following discharge from the hospital   If you have any questions, please ask before leaving  Please carry this letter with you when you see your doctor in the clinic  If you have questions, you can reach us at the numbers above        Follow Up Appointments:  If not listed below, and one has not already been made for you, call the general surgery office at [phone]  Activity:  No lifting pushing or pulling greater than 10 lbs for 2 weeks post op or until instructed otherwise by provider at your post op visit  No lifting greater than 20 lbs until you are 6 weeks post op  Diet:  Regular diet as tolerated  Incision:   Your incisions are closed with absorbable suture and covered with surgical glue  You may shower tomorrow  Do not submerge fully in water until after for follow up office visit  Pain Control: You should apply ice for 20 minutes on then 20 minutes off to your incision(s) for pain relief for the first 72 hours after surgery  Ice will decrease the immediate postoperative inflammation  After the first 72 hours you should switch to a heating pad in the same time increments  The heat will continue to allow the swelling to go down and decrease your pain  We do not typically prescribe narcotic pain medication after your type of surgery  You can try to take these following types of medications:  Tylenol: You may take over the counter Tylenol (Acetaminophen) 650 mg every 6 hours as needed  The maximum daily dose of Tylenol is 3000 mg  Avoid other medications with Tylenol  Motrin/Ibuprofen:  You may take Motrin (Ibuprofen) up to 800 mg every 8 hours as needed  If your pain is not as severe you can use less than 800mg (each over the counter tablet is 200mg)  The maximum daily dose of Ibuprofen is 3200mg  Avoid other NSAID medications while taking Ibuprofen  Do NOT take Motrin/Ibuprofen or any other NSAID drugs while taking Toradol, or if you are on Coumadin or any other blood thinner, or were instructed to avoid ibuprofen previously  Constipation: It is normal not to have a bowel movement for up to 3 days after surgery due to anesthesia  To prevent constipation, drink plenty of liquids and eat plenty of fiber which includes fruits & vegetables  After surgery for the regimen below for relief of constipation:    Recommend the following daily bowel regimen for constipation in order from 1 to 3:  1  Colace 100 mg capsules two-three times daily  2  Miralax 1 packet or capful (17 grams) in [facility] water daily to twice daily  3  Dulcolax suppository once daily as needed for constipation  If loose stools occur discontinue use of medications in reverse order (3,2,1)  Driving:  Don't drive until you are off narcotics for one full week and can walk normally and firmly apply the brake without pain  Date you may return to work or school: Will be discussed at your postop visit  Special Instructions:    Call if you have chills or a fever of greater than or equal to 100 5 degrees, any uncontrolled nausea, vomiting, bleeding, pain, diarrhea, constipation or shortness of breath  Regarding Anesthesia and Analgesia:  Do not drink alcoholic beverages, including beer, for 24 hours or while taking pain medication  Alcohol enhances the effects of anesthesia and sedation  Do not drive a motor vehicle, operate machinery or power tools for 24 hours  If a child, no bicycle riding, skateboards, gym sets, etc  For 24 hours  Do not make any important decisions or sign important papers for 24 hours    You may experience lightheadedness, dizziness and sleepiness following surgery/procedure  Please DO NOT STAY ALONE  A responsible adult should be with you for this 24 hour period  Rest at home with moderate activity as tolerated  It may be necessary to go to bed, however, it is important to rest for 24 hours  Progress slowly to a regular diet unless your physician has instructed you otherwise  Start with liquids such as soft drinks, then soup and crackers, gradually working up to solid foods  Certain anesthetics, pain medications and/or surgical procedures may produce nausea and vomitting in certain individuals  If nausea becomes a problem at home, call your physician  In the meantime, rest or sleep on our side to avoid accidentally inhaling material that you may vomit  Patients receiving general anesthesia may experience sore throat and general muscle aches, chest soreness  Should this occur, we recommend rest and liquids  These symptoms should disappear in approximately 24 hours  Patients having spinal anesthesia, we recommend drinking a lot of liquids containing caffeine, such as coffee, tea, cola, chocolate  If any symptoms persist, notify your physician  Incentive Spirometer Instructions:  Due to your surgery, it may be too painful to take deep breaths  You may also feel too weak to take deep breaths  When you do not breathe deeply enough, this can lead to your lungs not being completely inflated which in turn leads to shortness of breath, fever, and can increase your risk of postoperative pneumonia  An incentive spirometer is a device used to help you keep your lungs healthy while they are healing  The incentive spirometer teaches you how to take slow deep breaths  By using the incentive spirometer every 1 - 2 hours, or as directed by your nurse or doctor, you can take an active role in your recovery and keep your lungs healthy     How to Use an Incentive Spirometer   Sit up and hold the incentive spirometer upright  Place the mouthpiece of the incentive spirometer into your mouth  Make sure you make a good seal with your lips  Breathe out (exhale) normally  Breathe in SLOWLY (Inhale slowly)  A piece in the incentive spirometer will rise as you take a breath in  Try to get this piece to rise as high as you can  Usually, there is a marker placed by your health care provider that tells you how big of a breath you should take  Hold your breath for a few (3 - 5) seconds, then slowly release your breath and exhale  Repeat 10 - 15 breaths every 1 - 2 hours

## 2022-10-04 NOTE — PLAN OF CARE
Problem: MOBILITY - ADULT  Goal: Maintain or return to baseline ADL function  Description: INTERVENTIONS:  -  Assess patient's ability to carry out ADLs; assess patient's baseline for ADL function and identify physical deficits which impact ability to perform ADLs (bathing, care of mouth/teeth, toileting, grooming, dressing, etc )  - Assess/evaluate cause of self-care deficits   - Assess range of motion  - Assess patient's mobility; develop plan if impaired  - Assess patient's need for assistive devices and provide as appropriate  - Encourage maximum independence but intervene and supervise when necessary  - Involve family in performance of ADLs  - Assess for home care needs following discharge   - Consider OT consult to assist with ADL evaluation and planning for discharge  - Provide patient education as appropriate  Outcome: Progressing  Goal: Maintains/Returns to pre admission functional level  Description: INTERVENTIONS:  - Perform BMAT or MOVE assessment daily    - Set and communicate daily mobility goal to care team and patient/family/caregiver  - Collaborate with rehabilitation services on mobility goals if consulted  - Perform Range of Motion   times a day  - Reposition patient every   hours  - Dangle patient   times a day  - Stand patient     times a day  - Ambulate patient   times a day  - Out of bed to chair   times a day   - Out of bed for meals    times a day  - Out of bed for toileting  - Record patient progress and toleration of activity level   Outcome: Progressing     Problem: PAIN - ADULT  Goal: Verbalizes/displays adequate comfort level or baseline comfort level  Description: Interventions:  - Encourage patient to monitor pain and request assistance  - Assess pain using appropriate pain scale  - Administer analgesics based on type and severity of pain and evaluate response  - Implement non-pharmacological measures as appropriate and evaluate response  - Consider cultural and social influences on pain and pain management  - Notify physician/advanced practitioner if interventions unsuccessful or patient reports new pain  Outcome: Progressing     Problem: SAFETY ADULT  Goal: Maintain or return to baseline ADL function  Description: INTERVENTIONS:  -  Assess patient's ability to carry out ADLs; assess patient's baseline for ADL function and identify physical deficits which impact ability to perform ADLs (bathing, care of mouth/teeth, toileting, grooming, dressing, etc )  - Assess/evaluate cause of self-care deficits   - Assess range of motion  - Assess patient's mobility; develop plan if impaired  - Assess patient's need for assistive devices and provide as appropriate  - Encourage maximum independence but intervene and supervise when necessary  - Involve family in performance of ADLs  - Assess for home care needs following discharge   - Consider OT consult to assist with ADL evaluation and planning for discharge  - Provide patient education as appropriate  Outcome: Progressing  Goal: Maintains/Returns to pre admission functional level  Description: INTERVENTIONS:  - Perform BMAT or MOVE assessment daily    - Set and communicate daily mobility goal to care team and patient/family/caregiver  - Collaborate with rehabilitation services on mobility goals if consulted  - Perform Range of Motion   times a day  - Reposition patient every   hours  - Dangle patient   times a day  - Stand patient   times a day  - Ambulate patient   times a day  - Out of bed to chair   times a day   - Out of bed for meals    times a day  - Out of bed for toileting  - Record patient progress and toleration of activity level   Outcome: Progressing  Goal: Patient will remain free of falls  Description: INTERVENTIONS:  - Educate patient/family on patient safety including physical limitations  - Instruct patient to call for assistance with activity   - Consult OT/PT to assist with strengthening/mobility   - Keep Call bell within reach  - Keep bed low and locked with side rails adjusted as appropriate  - Keep care items and personal belongings within reach  - Initiate and maintain comfort rounds  - Make Fall Risk Sign visible to staff  - Offer Toileting every   Hours, in advance of need  - Initiate/Maintain    alarm  - Obtain necessary fall risk management equipment:      - Apply yellow socks and bracelet for high fall risk patients  - Consider moving patient to room near nurses station  Outcome: Progressing     Problem: DISCHARGE PLANNING  Goal: Discharge to home or other facility with appropriate resources  Description: INTERVENTIONS:  - Identify barriers to discharge w/patient and caregiver  - Arrange for needed discharge resources and transportation as appropriate  - Identify discharge learning needs (meds, wound care, etc )  - Arrange for interpretive services to assist at discharge as needed  - Refer to Case Management Department for coordinating discharge planning if the patient needs post-hospital services based on physician/advanced practitioner order or complex needs related to functional status, cognitive ability, or social support system  Outcome: Progressing     Problem: Knowledge Deficit  Goal: Patient/family/caregiver demonstrates understanding of disease process, treatment plan, medications, and discharge instructions  Description: Complete learning assessment and assess knowledge base    Interventions:  - Provide teaching at level of understanding  - Provide teaching via preferred learning methods  Outcome: Progressing

## 2022-10-05 VITALS
SYSTOLIC BLOOD PRESSURE: 116 MMHG | HEART RATE: 73 BPM | WEIGHT: 234 LBS | DIASTOLIC BLOOD PRESSURE: 78 MMHG | OXYGEN SATURATION: 94 % | BODY MASS INDEX: 31.69 KG/M2 | RESPIRATION RATE: 12 BRPM | TEMPERATURE: 98.2 F | HEIGHT: 72 IN

## 2022-10-05 PROBLEM — K42.9 UMBILICAL HERNIA: Status: RESOLVED | Noted: 2022-10-04 | Resolved: 2022-10-05

## 2022-10-05 RX ORDER — DOCUSATE SODIUM 100 MG/1
100 CAPSULE, LIQUID FILLED ORAL 2 TIMES DAILY
Qty: 28 CAPSULE | Refills: 0 | Status: SHIPPED | OUTPATIENT
Start: 2022-10-05 | End: 2022-10-19

## 2022-10-05 RX ORDER — IBUPROFEN 600 MG/1
600 TABLET ORAL EVERY 6 HOURS PRN
Qty: 30 TABLET | Refills: 0 | Status: SHIPPED | OUTPATIENT
Start: 2022-10-05

## 2022-10-05 RX ORDER — CALCIUM CARBONATE 200(500)MG
500 TABLET,CHEWABLE ORAL DAILY PRN
Status: DISCONTINUED | OUTPATIENT
Start: 2022-10-05 | End: 2022-10-05 | Stop reason: HOSPADM

## 2022-10-05 RX ADMIN — METOPROLOL SUCCINATE 50 MG: 50 TABLET, EXTENDED RELEASE ORAL at 07:58

## 2022-10-05 RX ADMIN — FLECAINIDE ACETATE 50 MG: 50 TABLET ORAL at 07:58

## 2022-10-05 RX ADMIN — CALCIUM CARBONATE (ANTACID) CHEW TAB 500 MG 500 MG: 500 CHEW TAB at 03:30

## 2022-10-05 RX ADMIN — ONDANSETRON 4 MG: 2 INJECTION INTRAMUSCULAR; INTRAVENOUS at 05:20

## 2022-10-05 RX ADMIN — CALCIUM CARBONATE (ANTACID) CHEW TAB 500 MG 500 MG: 500 CHEW TAB at 10:57

## 2022-10-05 RX ADMIN — OXYCODONE HYDROCHLORIDE AND ACETAMINOPHEN 2 TABLET: 5; 325 TABLET ORAL at 01:57

## 2022-10-05 RX ADMIN — EZETIMIBE 10 MG: 10 TABLET ORAL at 07:58

## 2022-10-05 RX ADMIN — OXYCODONE HYDROCHLORIDE AND ACETAMINOPHEN 2 TABLET: 5; 325 TABLET ORAL at 07:58

## 2022-10-05 RX ADMIN — DOCUSATE SODIUM 100 MG: 100 CAPSULE ORAL at 07:58

## 2022-10-05 NOTE — PLAN OF CARE
Problem: MOBILITY - ADULT  Goal: Maintain or return to baseline ADL function  Description: INTERVENTIONS:  -  Assess patient's ability to carry out ADLs; assess patient's baseline for ADL function and identify physical deficits which impact ability to perform ADLs (bathing, care of mouth/teeth, toileting, grooming, dressing, etc )  - Assess/evaluate cause of self-care deficits   - Assess range of motion  - Assess patient's mobility; develop plan if impaired  - Assess patient's need for assistive devices and provide as appropriate  - Encourage maximum independence but intervene and supervise when necessary  - Involve family in performance of ADLs  - Assess for home care needs following discharge   - Consider OT consult to assist with ADL evaluation and planning for discharge  - Provide patient education as appropriate  Outcome: Progressing     Problem: PAIN - ADULT  Goal: Verbalizes/displays adequate comfort level or baseline comfort level  Description: Interventions:  - Encourage patient to monitor pain and request assistance  - Assess pain using appropriate pain scale  - Administer analgesics based on type and severity of pain and evaluate response  - Implement non-pharmacological measures as appropriate and evaluate response  - Consider cultural and social influences on pain and pain management  - Notify physician/advanced practitioner if interventions unsuccessful or patient reports new pain  Outcome: Progressing     Problem: SAFETY ADULT  Goal: Maintain or return to baseline ADL function  Description: INTERVENTIONS:  -  Assess patient's ability to carry out ADLs; assess patient's baseline for ADL function and identify physical deficits which impact ability to perform ADLs (bathing, care of mouth/teeth, toileting, grooming, dressing, etc )  - Assess/evaluate cause of self-care deficits   - Assess range of motion  - Assess patient's mobility; develop plan if impaired  - Assess patient's need for assistive devices and provide as appropriate  - Encourage maximum independence but intervene and supervise when necessary  - Involve family in performance of ADLs  - Assess for home care needs following discharge   - Consider OT consult to assist with ADL evaluation and planning for discharge  - Provide patient education as appropriate  Outcome: Progressing     Problem: Potential for Falls  Goal: Patient will remain free of falls  Description: INTERVENTIONS:  - Educate patient/family on patient safety including physical limitations  - Instruct patient to call for assistance with activity   - Consult OT/PT to assist with strengthening/mobility   - Keep Call bell within reach  - Keep bed low and locked with side rails adjusted as appropriate  - Keep care items and personal belongings within reach  - Initiate and maintain comfort rounds  - Make Fall Risk Sign visible to staff  - Offer Toileting every  Hours, in advance of need  - Initiate/Maintain alarm  - Obtain necessary fall risk management equipment:   - Apply yellow socks and bracelet for high fall risk patients  - Consider moving patient to room near nurses station  Outcome: Progressing

## 2022-10-05 NOTE — PROGRESS NOTES
Progress Note - General Surgery   Lele Tsai 45 y o  male MRN: [id number]  Unit/Bed#: -01 Encounter: [id number]    Assessment:  - POD#1 s/p robotic-assisted umbilical hernia repair with mesh by Dr Swapnil Gage  - Abdomen appropriately tender  - States he ambulated twice after surgery but did feel lightheaded the second time  - Tolerating diet well  - PVCs noted after surgery  No evidence of VT noted post-operatively by cardiology provider   - VSS and WNL    Plan:  - Will await evaluation of telemetry strip by cardiology  If no significant findings will discharge today  - No strenuous activity or lifting over 10 lb for 6 weeks  Light activity as tolerated  - Follow-up with Dr Swapnil Gage at [facility] surgery in 2 weeks  - Ice to areas of swelling and tenderness often  - Do not remove glue from incisions for 2 weeks  - Ibuprofen and tylenol for pain control   - Colace    Subjective/Objective     Subjective:  Patient reports his abdomen is sore today  He ambulated twice last night  The 1st time he did not have any problems  However, he says he got lightheaded the second time  Tolerating diet well  Denies postprandial pain, nausea, vomiting, diarrhea, fever, chills, CP, or SOB  Objective:     Blood pressure 116/78, pulse 61, temperature 98 2 °F (36 8 °C), resp  rate 12, height 6' (1 829 m), weight 106 kg (234 lb), SpO2 94 %  ,Body mass index is 31 74 kg/m²        Intake/Output Summary (Last 24 hours) at 10/5/2022 0720  Last data filed at 10/5/2022 0538  Gross per 24 hour   Intake 2200 ml   Output 2300 ml   Net -100 ml       Invasive Devices  Report    Peripheral Intravenous Line  Duration           Peripheral IV 10/04/22 Left Hand <1 day                Physical Exam: /78   Pulse 61   Temp 98 2 °F (36 8 °C)   Resp 12   Ht 6' (1 829 m)   Wt 106 kg (234 lb)   SpO2 94%   BMI 31 74 kg/m²   General appearance: alert and oriented, in no acute distress  Lungs: clear to auscultation bilaterally  Heart: Regular rhythm with occasional abberant beat  Abdomen: Post-operative with 3 incisions covered with glue  Hypoactive BS heard throughout  Appropriately tender  Mild distension      VTE Mechanical Prophylaxis: sequential compression device     Jack Calles

## 2022-10-05 NOTE — PLAN OF CARE
Problem: MOBILITY - ADULT  Goal: Maintain or return to baseline ADL function  Description: INTERVENTIONS:  -  Assess patient's ability to carry out ADLs; assess patient's baseline for ADL function and identify physical deficits which impact ability to perform ADLs (bathing, care of mouth/teeth, toileting, grooming, dressing, etc )  - Assess/evaluate cause of self-care deficits   - Assess range of motion  - Assess patient's mobility; develop plan if impaired  - Assess patient's need for assistive devices and provide as appropriate  - Encourage maximum independence but intervene and supervise when necessary  - Involve family in performance of ADLs  - Assess for home care needs following discharge   - Consider OT consult to assist with ADL evaluation and planning for discharge  - Provide patient education as appropriate  Outcome: Progressing  Goal: Maintains/Returns to pre admission functional level  Description: INTERVENTIONS:  - Perform BMAT or MOVE assessment daily    - Set and communicate daily mobility goal to care team and patient/family/caregiver  - Collaborate with   times a day  - Reposition patient every   hours  - Dangle patient   times a day  - Stand patient   times a day  - Ambulate patient   times a day  - Out of bed to chair   times a day   - Out of bed for meals     times a day  - Out of bed for toileting  - Record patient progress and toleration of activity level   Outcome: Progressing     Problem: PAIN - ADULT  Goal: Verbalizes/displays adequate comfort level or baseline comfort level  Description: Interventions:  - Encourage patient to monitor pain and request assistance  - Assess pain using appropriate pain scale  - Administer analgesics based on type and severity of pain and evaluate response  - Implement non-pharmacological measures as appropriate and evaluate response  - Consider cultural and social influences on pain and pain management  - Notify physician/advanced practitioner if interventions unsuccessful or patient reports new pain  Outcome: Progressing     Problem: SAFETY ADULT  Goal: Maintain or return to baseline ADL function  Description: INTERVENTIONS:  -  Assess patient's ability to carry out ADLs; assess patient's baseline for ADL function and identify physical deficits which impact ability to perform ADLs (bathing, care of mouth/teeth, toileting, grooming, dressing, etc )  - Assess/evaluate cause of self-care deficits   - Assess range of motion  - Assess patient's mobility; develop plan if impaired  - Assess patient's need for assistive devices and provide as appropriate  - Encourage maximum independence but intervene and supervise when necessary  - Involve family in performance of ADLs  - Assess for home care needs following discharge   - Consider OT consult to assist with ADL evaluation and planning for discharge  - Provide patient education as appropriate  Outcome: Progressing  Goal: Maintains/Returns to pre admission functional level  Description: INTERVENTIONS:  - Perform BMAT or MOVE assessment daily    - Set and communicate daily mobility goal to care team and patient/family/caregiver  - Collaborate with   times a day  - Reposition patient every   hours  - Dangle patient   times a day  - Stand patient   times a day  - Ambulate patient   times a day  - Out of bed to chair   times a day   - Out of bed for meals     times a day  - Out of bed for toileting  - Record patient progress and toleration of activity level   Outcome: Progressing  Goal: Patient will remain free of falls  Description: INTERVENTIONS:  - Educate patient/family on patient safety including physical limitations  - Instruct patient to call for assistance with activity   - Consult OT/PT to assist with strengthening/mobility   - Keep Call bell within reach  - Keep bed low and locked with side rails adjusted as appropriate  - Keep care items and personal belongings within reach  - Initiate and maintain comfort rounds  - Make Fall Risk Sign visible to staff  - Offer Toileting every   Hours, in advance of need  - Initiate/Maintain   alarm  - Obtain necessary fall risk management equipment:      - Apply yellow socks and bracelet for high fall risk patients  - Consider moving patient to room near nurses station  Outcome: Progressing     Problem: DISCHARGE PLANNING  Goal: Discharge to home or other facility with appropriate resources  Description: INTERVENTIONS:  - Identify barriers to discharge w/patient and caregiver  - Arrange for needed discharge resources and transportation as appropriate  - Identify discharge learning needs (meds, wound care, etc )  - Arrange for interpretive services to assist at discharge as needed  - Refer to Case Management Department for coordinating discharge planning if the patient needs post-hospital services based on physician/advanced practitioner order or complex needs related to functional status, cognitive ability, or social support system  Outcome: Progressing     Problem: Knowledge Deficit  Goal: Patient/family/caregiver demonstrates understanding of disease process, treatment plan, medications, and discharge instructions  Description: Complete learning assessment and assess knowledge base  Interventions:  - Provide teaching at level of understanding  - Provide teaching via preferred learning methods  Outcome: Progressing     Problem: Potential for Falls  Goal: Patient will remain free of falls  Description: INTERVENTIONS:  - Educate patient/family on patient safety including physical limitations  - Instruct patient to call for assistance with activity   - Consult OT/PT to assist with strengthening/mobility   - Keep Call bell within reach  - Keep bed low and locked with side rails adjusted as appropriate  - Keep care items and personal belongings within reach  - Initiate and maintain comfort rounds  - Make Fall Risk Sign visible to staff  - Offer Toileting every    Hours, in advance of need  - Initiate/Maintain   alarm  - Obtain necessary fall risk management equipment:      - Apply yellow socks and bracelet for high fall risk patients  - Consider moving patient to room near nurses station  Outcome: Progressing

## 2022-10-05 NOTE — UTILIZATION REVIEW
Inpatient Admission Authorization Request   NOTIFICATION OF INPATIENT ADMISSION/INPATIENT AUTHORIZATION REQUEST   SERVICING FACILITY:   [address] [place] [address]  Kimberly Wong  [facility], [address]  Tax ID: [id number]  NPI: [id number]  Place of Service: Inpatient [facility]  [address]  Place of Service Code: 24     ATTENDING PROVIDER:  Attending Name and NPI#: Mercedez Slaters [[id number]]  Address: Kimberly Wong  [facility], [facility] [address]  Phone: [phone]     UTILIZATION REVIEW CONTACT:  Beatriz Laws, Utilization   Network Utilization Review Department  Phone: [phone]  Fax [phone]  Email: [email or web address]     PHYSICIAN ADVISORY SERVICES:  FOR [id number] REVIEW - MEDICAL NECESSITY DENIAL  Phone: [phone]  Fax: [phone]  Email: [email or web address]     TYPE OF REQUEST:  Inpatient Status     ADMISSION INFORMATION:  ADMISSION DATE/TIME: 10/4/22 12:59 PM  PATIENT DIAGNOSIS CODE/DESCRIPTION:  Umbilical hernia without obstruction or gangrene [K42 9]  Lipoma of abdominal wall [D17 1]  DISCHARGE DATE/TIME: No discharge date for patient encounter  IMPORTANT INFORMATION:  Please contact Gabbie Rosenthal directly with any questions or concerns regarding this request  Department voicemails are confidential     Send requests for admission clinical reviews, concurrent reviews, approvals, and administrative denials due to lack of clinical to fax [phone]

## 2022-10-05 NOTE — DISCHARGE SUMMARY
Discharge Summary - Abdoulaye Valverde 45 y o  male MRN: [id number]    Unit/Bed#: -01 Encounter: [id number]    Admission Date:   Admission Orders (From admission, onward)     Ordered        10/04/22 1228  Inpatient Admission  Once                        Admitting Diagnosis: Umbilical hernia without obstruction or gangrene [K42 9]  Lipoma of abdominal wall [D17 1]    HPI: Abdoulaye Valverde is a 45year old adult who presented for outpatient robotic assisted umbilical hernia repair by Dr Matt Crews  Originally presented as an outpatient in clinic for umbilical hernia and abdominal wall mass  He has been seen in the office 2 month ago for the same and underwent abdominal ultrasound to evaluate his symptoms  He was found to have a bowel-containing umbilical hernia as well as a subcutaneous soft tissue mass in the left abdominal wall  He states that both areas cause him discomfort on a daily basis and makes it difficult for him to bend over and perform daily activities  He has a hx of chronic neck and back pain and has an implanted spinal stimulator for this  He smokes 1/2 pack of cigarettes per day  He has atrial fibrillation and was started on Flecainide at his last cardiologist appointment when he was evaluated for cardiac clearance  Procedures Performed:  Robotic-assisted umbilical hernia repair with mesh    Summary of Hospital Course:  Patient presented due to he is still for scheduled outpatient robotic-assisted umbilical hernia repair  The repair was completed by Dr Matt Crews  Patient noted to have PVC without VT after surgery and was evaluated by cardiology  Patient does have cardiac history with atrial fib and previous PVC  Patient kept overnight for observation  No acute episodes overnight  Patient's condition continued to improve  Patient cleared by Cardiology POD#1 and was ready for discharge      Significant Findings, Care, Treatment and Services Provided:  Robotic-assisted umbilical hernia repair with mesh    Complications: None    Discharge Diagnosis: s/p umbilical hernia repair    Medical Problems             Resolved Problems  Date Reviewed: 10/4/2022   None                 Condition at Discharge: good         Discharge instructions/Information to patient and family:   See after visit summary for information provided to patient and family  Provisions for Follow-Up Care:  See after visit summary for information related to follow-up care and any pertinent home health orders  PCP: Corina Barrios MD    Disposition: Home    Planned Readmission: No      Discharge Statement   I spent 20 minutes discharging the patient  This time was spent on the day of discharge  I had direct contact with the patient on the day of discharge  Additional documentation is required if more than 30 minutes were spent on discharge  Discharge Medications:  See after visit summary for reconciled discharge medications provided to patient and family         Harjinder Cheema

## 2022-10-05 NOTE — QUICK NOTE
Tele check  No events on telemetry  No further changes in regimen indicated at this time from a cardiac perspective  Patient already has OP fu with my practice      Ingris Feldman PA-C  10/5/2022

## 2022-10-05 NOTE — PROGRESS NOTES
RN walked pt about 30 feet in hallway before pt stated "I have to go back"  He reported feeling dizzy  He walked about 10 feet before getting more dizzy and lightheaded where staff had to put him in wheelchair back to bed  Vital signs stable  Bilateral hand, arm, legs equal  Eyes round and reactive  He denied chest pain, numbness, tingling, weakness  After being in bed about 5 minutes, pt reported "I felt really dizzy"  Reported nausea  Zofran given  Currently resting in bed

## 2022-10-06 LAB
ATRIAL RATE: 68 BPM
ATRIAL RATE: 69 BPM
P AXIS: 63 DEGREES
P AXIS: 68 DEGREES
PR INTERVAL: 176 MS
PR INTERVAL: 180 MS
QRS AXIS: 57 DEGREES
QRS AXIS: 61 DEGREES
QRSD INTERVAL: 88 MS
QRSD INTERVAL: 92 MS
QT INTERVAL: 398 MS
QT INTERVAL: 418 MS
QTC INTERVAL: 426 MS
QTC INTERVAL: 444 MS
T WAVE AXIS: 48 DEGREES
T WAVE AXIS: 54 DEGREES
VENTRICULAR RATE: 68 BPM
VENTRICULAR RATE: 69 BPM

## 2022-10-06 RX ORDER — OXYCODONE HYDROCHLORIDE AND ACETAMINOPHEN 5; 325 MG/1; MG/1
1 TABLET ORAL EVERY 4 HOURS PRN
Qty: 30 TABLET | Refills: 0 | Status: SHIPPED | OUTPATIENT
Start: 2022-10-06 | End: 2022-10-25 | Stop reason: SDUPTHER

## 2022-10-18 PROCEDURE — 88304 TISSUE EXAM BY PATHOLOGIST: CPT | Performed by: PATHOLOGY

## 2022-10-25 DIAGNOSIS — K42.9 UMBILICAL HERNIA WITHOUT OBSTRUCTION AND WITHOUT GANGRENE: ICD-10-CM

## 2022-10-25 DIAGNOSIS — G89.18 POST-OPERATIVE PAIN: Primary | ICD-10-CM

## 2022-10-25 RX ORDER — OXYCODONE HYDROCHLORIDE AND ACETAMINOPHEN 5; 325 MG/1; MG/1
1 TABLET ORAL EVERY 4 HOURS PRN
Qty: 30 TABLET | Refills: 0 | Status: SHIPPED | OUTPATIENT
Start: 2022-10-25

## 2022-10-25 NOTE — PROGRESS NOTES
Patient having ongoing post-operative pain, worse with movements, limiting his ability to perform daily activities  Requesting refill of pain medication given post-operatively  Will refill one time to get him through the healing process

## 2024-04-05 ENCOUNTER — HOSPITAL ENCOUNTER (OUTPATIENT)
Dept: NON INVASIVE DIAGNOSTICS | Facility: HOSPITAL | Age: 54
Discharge: HOME | End: 2024-04-05
Payer: COMMERCIAL

## 2024-04-05 VITALS
HEART RATE: 80 BPM | BODY MASS INDEX: 31.69 KG/M2 | DIASTOLIC BLOOD PRESSURE: 78 MMHG | SYSTOLIC BLOOD PRESSURE: 116 MMHG | WEIGHT: 234 LBS | HEIGHT: 72 IN

## 2024-04-05 DIAGNOSIS — E78.5 HYPERLIPIDEMIA, UNSPECIFIED: ICD-10-CM

## 2024-04-05 DIAGNOSIS — I25.10 ATHEROSCLEROTIC HEART DISEASE OF NATIVE CORONARY ARTERY WITHOUT ANGINA PECTORIS: ICD-10-CM

## 2024-04-05 DIAGNOSIS — I49.3 VENTRICULAR PREMATURE DEPOLARIZATION: ICD-10-CM

## 2024-04-05 DIAGNOSIS — F17.200 NICOTINE DEPENDENCE, UNSPECIFIED, UNCOMPLICATED: ICD-10-CM

## 2024-04-05 DIAGNOSIS — I48.0 PAROXYSMAL ATRIAL FIBRILLATION (HCC): ICD-10-CM

## 2024-04-05 LAB
AORTIC ROOT: 3.5 CM
APICAL FOUR CHAMBER EJECTION FRACTION: 55 %
BSA FOR ECHO PROCEDURE: 2.28 M2
E WAVE DECELERATION TIME: 218 MS
E/A RATIO: 0.9
FRACTIONAL SHORTENING: 29 (ref 28–44)
INTERVENTRICULAR SEPTUM IN DIASTOLE (PARASTERNAL SHORT AXIS VIEW): 1.4 CM
INTERVENTRICULAR SEPTUM: 1.4 CM (ref 0.6–1.1)
LAAS-AP2: 15.8 CM2
LAAS-AP4: 13.3 CM2
LEFT ATRIUM SIZE: 3.6 CM
LEFT ATRIUM VOLUME (MOD BIPLANE): 37 ML
LEFT ATRIUM VOLUME INDEX (MOD BIPLANE): 16.2 ML/M2
LEFT INTERNAL DIMENSION IN SYSTOLE: 3.2 CM (ref 2.1–4)
LEFT VENTRICULAR INTERNAL DIMENSION IN DIASTOLE: 4.5 CM (ref 3.5–6)
LEFT VENTRICULAR POSTERIOR WALL IN END DIASTOLE: 1.2 CM
LEFT VENTRICULAR STROKE VOLUME: 51 ML
LVSV (TEICH): 51 ML
MV E'TISSUE VEL-SEP: 7 CM/S
MV PEAK A VEL: 0.6 M/S
MV PEAK E VEL: 54 CM/S
RIGHT ATRIUM AREA SYSTOLE A4C: 15.2 CM2
RIGHT VENTRICLE ID DIMENSION: 2.8 CM
SL CV LEFT ATRIUM LENGTH A2C: 4.6 CM
SL CV LV EF: 56
SL CV PED ECHO LEFT VENTRICLE DIASTOLIC VOLUME (MOD BIPLANE) 2D: 92 ML
SL CV PED ECHO LEFT VENTRICLE SYSTOLIC VOLUME (MOD BIPLANE) 2D: 41 ML
TR MAX PG: 22 MMHG
TR PEAK VELOCITY: 2.3 M/S
TRICUSPID ANNULAR PLANE SYSTOLIC EXCURSION: 2 CM
TRICUSPID VALVE PEAK REGURGITATION VELOCITY: 2.32 M/S

## 2024-04-05 PROCEDURE — 93306 TTE W/DOPPLER COMPLETE: CPT

## 2024-09-10 ENCOUNTER — HOSPITAL ENCOUNTER (OUTPATIENT)
Dept: RADIOLOGY | Facility: CLINIC | Age: 54
Discharge: HOME | End: 2024-09-10
Payer: COMMERCIAL

## 2024-09-10 ENCOUNTER — OFFICE VISIT (OUTPATIENT)
Dept: OBGYN CLINIC | Facility: CLINIC | Age: 54
End: 2024-09-10
Payer: COMMERCIAL

## 2024-09-10 VITALS
SYSTOLIC BLOOD PRESSURE: 136 MMHG | TEMPERATURE: 97.8 F | WEIGHT: 234 LBS | DIASTOLIC BLOOD PRESSURE: 84 MMHG | HEART RATE: 78 BPM | BODY MASS INDEX: 31.69 KG/M2 | OXYGEN SATURATION: 98 % | HEIGHT: 72 IN

## 2024-09-10 DIAGNOSIS — M19.171 POST-TRAUMATIC OSTEOARTHRITIS OF RIGHT ANKLE: ICD-10-CM

## 2024-09-10 DIAGNOSIS — G89.29 CHRONIC PAIN OF RIGHT ANKLE: Primary | ICD-10-CM

## 2024-09-10 DIAGNOSIS — M25.571 CHRONIC PAIN OF RIGHT ANKLE: Primary | ICD-10-CM

## 2024-09-10 DIAGNOSIS — Z98.890 HISTORY OF ANKLE SURGERY: ICD-10-CM

## 2024-09-10 DIAGNOSIS — G89.29 CHRONIC PAIN OF RIGHT ANKLE: ICD-10-CM

## 2024-09-10 DIAGNOSIS — M25.571 CHRONIC PAIN OF RIGHT ANKLE: ICD-10-CM

## 2024-09-10 PROCEDURE — 20605 DRAIN/INJ JOINT/BURSA W/O US: CPT | Performed by: STUDENT IN AN ORGANIZED HEALTH CARE EDUCATION/TRAINING PROGRAM

## 2024-09-10 PROCEDURE — 99204 OFFICE O/P NEW MOD 45 MIN: CPT | Performed by: STUDENT IN AN ORGANIZED HEALTH CARE EDUCATION/TRAINING PROGRAM

## 2024-09-10 PROCEDURE — 73610 X-RAY EXAM OF ANKLE: CPT

## 2024-09-10 RX ORDER — LIDOCAINE HYDROCHLORIDE 10 MG/ML
0.5 INJECTION, SOLUTION INFILTRATION; PERINEURAL
Status: COMPLETED | OUTPATIENT
Start: 2024-09-10 | End: 2024-09-10

## 2024-09-10 RX ORDER — TRIAMCINOLONE ACETONIDE 40 MG/ML
20 INJECTION, SUSPENSION INTRA-ARTICULAR; INTRAMUSCULAR
Status: COMPLETED | OUTPATIENT
Start: 2024-09-10 | End: 2024-09-10

## 2024-09-10 RX ADMIN — TRIAMCINOLONE ACETONIDE 20 MG: 40 INJECTION, SUSPENSION INTRA-ARTICULAR; INTRAMUSCULAR at 10:30

## 2024-09-10 RX ADMIN — LIDOCAINE HYDROCHLORIDE 0.5 ML: 10 INJECTION, SOLUTION INFILTRATION; PERINEURAL at 10:30

## 2024-09-10 NOTE — PROGRESS NOTES
1. Chronic pain of right ankle  XR ankle 3+ vw right    Ankle Cude ankle/Ankle Brace    Ambulatory Referral to Podiatry    Medium joint arthrocentesis: R ankle      2. History of ankle surgery  XR ankle 3+ vw right    Ankle Cude ankle/Ankle Brace    Ambulatory Referral to Podiatry    Medium joint arthrocentesis: R ankle      3. Post-traumatic osteoarthritis of right ankle  XR ankle 3+ vw right    Ankle Cude ankle/Ankle Brace    Ambulatory Referral to Podiatry    Medium joint arthrocentesis: R ankle        Orders Placed This Encounter   Procedures    Medium joint arthrocentesis: R ankle    Ankle Cude ankle/Ankle Brace    XR ankle 3+ vw right    Ambulatory Referral to Podiatry        Imaging Studies (I personally reviewed images in PACS and report):    X-ray right ankle 9/10/2024: No acute osseous abnormalities. Prior ORIF hardware noted. Small plantar calcaneal spur. Soft tissues unremarkable.    CT right ankle without contrast 5/23/2023: Via [facility]: Only report is available noting  1. Postsurgical changes of ORIF of the distal tibia.   2. Osteoarthritic changes of the tibiotalar joint with bony spurring in the   posterior aspect of the talus as well as anterior and posterior aspects of the   distal tibia. Bony spurring of the distal fibula.   3. No acute osseous abnormality.   4.There appears to be some thickening of the peroneus brevis and longus tendon   in the retromalleolar region suggestive of tendinosis.   5. Tenosynovitis of the flexor hallucis longus at the level of sustentaculum   talus and plantar aspect of the foot.       IMPRESSION:  Chronic atraumatic right ankle and foot pain  History of ORIF from prior distal tibial fracture 20+ years ago  Posttraumatic osteoarthritis of right ankle and midfoot  Had planned surgery with [facility] last year but patient's states his father got sick and had to hold off surgery. Currently is interested in pursuing surgical intervention.    Other factors:  Tobacco use disorder  "with 40+ pack years  BMI 31.74    PLAN:    Clinical exam and radiographic imaging reviewed with patient today, with impression as per above. I have discussed with the patient the pathophysiology of this diagnosis and reviewed how the examination correlates with this diagnosis.    Imaging obtained/reviewed as per above. I will follow up official radiology interpretation.  Reviewed prior CT imaging of his ankle as above as well.  Counseled patient that I am not an orthopedic surgeon that could discuss surgical intervention.  I counseled that we do have an orthopedic foot and ankle surgeon but he would [place] more so out of [place].  Patient states this is too far for him.  I did suggest he discuss with his prior orthopedic surgeon that he had saw last year for this condition.  Alternatively, he could get a second opinion from podiatric surgery as well patient states he will hold onto this referral while he seeks out an opinion from his prior surgeon.  Referral placed.  In the interim, other conservative treatment modalities discussed including an intra-articular right ankle cortisone injection to provide some degree of pain relief.  Rare/benefits were discussed and patient was agreeable to this injection today as per procedure note below.  Prior to this injection I did  that with an intra-articular cortisone injection of his ankle, surgeon may not want to perform surgery for at least 2 to 3 months and patient states he understands this risks.  I discussed another limiting factor to surgery as his ongoing tobacco use.  I counseled that he may need to defer surgery as well given his history of tobacco use.  Furthermore I did prescribe him an ankle brace to provide some degree of support while weightbearing.    Return for Follow up with podiatry for second opinion/surgery.    Portions of the record may have been created with voice recognition software. Occasional wrong word or \"sound a like\" substitutions may have " occurred due to the inherent limitations of voice recognition software. Read the chart carefully and recognize, using context, where substitutions have occurred.     CHIEF COMPLAINT:  Chief Complaint   Patient presents with    Right Ankle - Pain         HPI:  Jason Atkinson is a 54 y.o. male  who presents for       Visit 9/10/2024:  Initial evaluation of right ankle pain  On chart review there is an orthopedic note for his chronic right ankle pain from 5/17/2023.  He was noted to have a history of ORIF of his right ankle after fall from a ladder 20+ years ago.  He still has hardware of his midfoot/ankle. There is plan for him to follow-up after a CT of his right foot but patient had been smoking/tobacco use at the time and he was recommended to be off tobacco products at least 6 weeks. Patient also notes he was unable to pursue surgery last year as his father got sick and states he had to defer surgery at that time.    Of note patient has a history of chronic neck and lumbar pain.  He has a spinal cord stimulator placed.  He is on chronic opiates.  He has a history of tobacco use disorder.    He reports constant severe, 10/10, pain of his right ankle that is aggravated with any range of motion movement, weightbearing.  He reports stiffness of his ankle, swelling and describes a sharp/aching pain of severe intensity especially with prolonged weightbearing and motion.  He is a lace up ankle brace but feels it has been worn down to a point that is no longer providing support.  As a result he has to use a cane at baseline for mobility purposes.  He reports at this point that his father who was ill is currently being taken care of and patient is able to pursue surgical intervention of his ankle.  Patient believes that he was supposed to undergo some surgical intervention but is unsure of the details.  He wants to avoid fusion of his ankle due to is concerned that he will lose motion of his ankle and would be unable to  drive.  He states that he has never had any ankle injection previously with cortisone.      In regards to his prior surgeon from [facility], patient states that he would be able to reach out to him again but would like to see whether there is a surgeon available at our [place] office that could perform surgical intervention for his ankle as well.      Medical, Surgical, Family, and Social History    Past Medical History:   Diagnosis Date    A-fib (HCC)     Abdominal lipoma     At risk for falls     Chronic pain disorder     neck and back    Coronary artery disease     Fatty liver     Frequent PVCs     Heart burn     Hyperlipidemia     Smoker     Umbilical hernia     Use of cane as ambulatory aid      Past Surgical History:   Procedure Laterality Date    BACK SURGERY      5-6 surgeries including a cervical fusion    CARDIAC CATHETERIZATION      9/7/2021    FRACTURE SURGERY Right     Pt has screws and plate    SC EXC TUMOR SOFT TISSUE ABDOMINAL WALL SUBQ 3 CM/> N/A 10/4/2022    Procedure: ABDOMINAL WALL LIPOMA EXCISION;  Surgeon: Efrain Ceron DO;  Location:  MAIN OR;  Service: General    SC LAPS REPAIR HERNIA EXCEPT INCAL/INGUN REDUCIBLE N/A 10/4/2022    Procedure: ROBOTIC ASSISTED LAPAROSCOPIC UMBILICAL HERNIA REPAIR WITH MESH;  Surgeon: Efrain Ceron DO;  Location: [facility];  Service: General    SPINAL STIMULATOR PLACEMENT       Social History   Social History     Substance and Sexual Activity   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 No pertinent family history.  No Known Allergies       Physical Exam  /84   Pulse 78   Temp 97.8 °F (36.6 °C)  (Temporal)   Ht 6' (1.829 m)   Wt 106 kg (234 lb)   SpO2 98%   BMI 31.74 kg/m²     Constitutional:  see vital signs  Gen: well-developed, normocephalic/atraumatic, well-groomed  Eyes: No inflammation or discharge of conjunctiva or lids; sclera clear   Pulmonary/Chest: Effort normal. No respiratory distress.       Ortho Exam   Ankle Examination (focused):     Gait: Antalgic gait; patient using lace up ankle brace and clinic wheelchair for exam today      RIGHT   Inspection Erythema none    Edema none    Ecchymosis none        ROM:   Patient holds ankle in a neutral poistion and is able to plantarflex/dorsiflex his foot about 10 degrees due to reported exacerbation of pain of his general ankle             Strength Pronation Deferred due to pain    Supination Deferred due to pain    Foot plantarflexion Deferred due to pain    Foot dorsflexion Deferred due to pain        TTP  +hyperalgesic reaction to light palpation throughout his tibiotalar joint, medial malleolus, lateral malleolus, midfoot             Calcaneal Squeeze  negative   Tib-Fib Squeeze Test  negative   MT Compression  negative         No calf tenderness to palpation     LE NV Exam: +2 DP/PT pulses   Sensation decreased throughout right foot per patient          Medium joint arthrocentesis: R ankle  Universal Protocol:  procedure performed by consultantConsent: Verbal consent obtained.  Risks and benefits: risks, benefits and alternatives were discussed  Consent given by: patient  Patient understanding: patient states understanding of the procedure being performed  Site marked: the operative site was marked  Radiology Images displayed and confirmed. If images not available, report reviewed: imaging studies available  Patient identity confirmed: verbally with patient  Supporting Documentation  Indications: pain and diagnostic evaluation   Procedure Details  Location: ankle - R ankle  Preparation: Patient was prepped and draped in the usual sterile  fashion  Needle size: 25 G  Ultrasound guidance: no  Approach: anteromedial  Medications administered: 0.5 mL lidocaine 1 %; 20 mg triamcinolone acetonide 40 mg/mL    Patient tolerance: patient tolerated the procedure well with no immediate complications  Dressing:  Sterile dressing applied

## 2024-09-10 NOTE — PATIENT INSTRUCTIONS
"Patient Education     Steroid injection   The Basics   Written by the doctors and editors at [facility]   What is a steroid injection? -- Steroids, also known as \"glucocorticoids,\" are medicines that help reduce swelling and pain. Doctors sometimes inject a steroid medicine into a joint or other part of the body to relieve pain. This is also sometimes called a \"cortisone shot.\"  After the injection, the steroid starts to work within a few days.  How long does a steroid injection work? -- It depends on the person and where the injection is given. For some people, the effects of a steroid injection can last for a few weeks or longer.  Sometimes, the doctor also injects a medicine called a \"local anesthetic\" with the steroid. This might help relieve pain until the steroid starts to work.  A steroid injection can help with pain, but it won't cure the problem that is causing the pain.  How do I prepare for a steroid injection? -- The doctor or nurse will tell you if you need to do anything special to prepare. Before your procedure, your doctor will do an exam.  Your doctor will also ask you about your \"health history.\" This involves asking you questions about any health problems you have or had in the past, past surgeries, and any medicines you take. Tell them about:   Any medicines you are taking - This includes any prescription or \"over-the-counter\" medicines you use, plus any herbal supplements you take. It helps to write down and bring a list of any medicines you take, or bring a bag with all of your medicines with you.   Any allergies you have   Any bleeding problems you have   Any other steroid injections you have had  Ask the doctor or nurse if you have questions or if there is anything you do not understand.  How is a steroid injection given? -- When it is time for the injection:   The doctor will clean the skin over the area where they plan to give the shot. (This is called the \"injection site.\")   They might use " ultrasound or a special kind of X-ray during the procedure. This is to check where to give the steroid.   Sometimes, they might give a shot of medicine to numb the skin.   They will inject the steroid.   Then, they will take out the needle and cover the injection site with a bandage.  What happens after a steroid injection? -- You can go home after the injection.  For the next few days, you might want to:   Apply a cold gel pack, bag of ice, or bag of frozen vegetables to the injection site every 1 to 2 hours, for 15 minutes each time. Put a thin towel between the ice (or other cold object) and your skin.   Rest the treated body part for a few days.   Take medicines to relieve pain. Examples include acetaminophen (sample brand name: Tylenol), ibuprofen (sample brand names: Advil, Motrin), or naproxen (sample brand name: Aleve).  If you have diabetes, the doctor might want you to check your blood sugar levels more often for a few days. The steroid medicine might temporarily raise your blood sugar.  What are the risks of a steroid injection? -- Your doctor will talk to you about all of the possible risks, and answer your questions. Possible risks include:   Bleeding, bruising, or soreness at the injection site   Damage to parts near the injection site - This might include cartilage damage, injury to nerves, tendon rupture, or thinning of skin and bones.   Skin around the injection site becoming lighter or whiter in color   Infection   Health conditions like diabetes or high blood pressure getting worse for a few days   Allergic reaction to the medicine  What else should I know? -- Most of the time, doctors limit the total number of steroid injections to a certain area.  A steroid injection might be only 1 part of your treatment plan. Take your other medicines as instructed. Also, follow your doctor's recommendations about other treatments. These might include things like physical therapy or devices like a brace or  cane.  All topics are updated as new evidence becomes available and our peer review process is complete.  This topic retrieved from [employer] on: Apr 25, 2024.  Topic 837175 Version 2.0  Release: 32.4.2 - C32.114  © 2024 [employer] and/or its affiliates. All rights reserved.  Consumer Information Use and Disclaimer   Disclaimer: This generalized information is a limited summary of diagnosis, treatment, and/or medication information. It is not meant to be comprehensive and should be used as a tool to help the user understand and/or assess potential diagnostic and treatment options. It does NOT include all information about conditions, treatments, medications, side effects, or risks that may apply to a specific patient. It is not intended to be medical advice or a substitute for the medical advice, diagnosis, or treatment of a health care provider based on the health care provider's examination and assessment of a patient's specific and unique circumstances. Patients must speak with a health care provider for complete information about their health, medical questions, and treatment options, including any risks or benefits regarding use of medications. This information does not endorse any treatments or medications as safe, effective, or approved for treating a specific patient. [employer] and its affiliates disclaim any warranty or liability relating to this information or the use thereof.The use of this information is governed by the Terms of Use, available at https://www.woltersTricidauwer.com/en/know/clinical-effectiveness-terms. 2024© [employer] and its affiliates and/or licensors. All rights reserved.  Copyright   © 2024 [employer] and/or its affiliates. All rights reserved.

## 2025-07-16 ENCOUNTER — HOSPITAL ENCOUNTER (EMERGENCY)
Facility: HOSPITAL | Age: 55
Discharge: HOME | End: 2025-07-16
Payer: COMMERCIAL

## 2025-07-16 VITALS
TEMPERATURE: 97.7 F | HEART RATE: 77 BPM | RESPIRATION RATE: 18 BRPM | BODY MASS INDEX: 31.19 KG/M2 | SYSTOLIC BLOOD PRESSURE: 109 MMHG | OXYGEN SATURATION: 97 % | WEIGHT: 230 LBS | DIASTOLIC BLOOD PRESSURE: 81 MMHG

## 2025-07-16 RX ORDER — ACETAMINOPHEN 325 MG/1
650 TABLET ORAL ONCE
Status: COMPLETED | OUTPATIENT
Start: 2025-07-16 | End: 2025-07-16

## 2025-07-16 RX ADMIN — ACETAMINOPHEN 650 MG: 325 TABLET ORAL at 12:54

## (undated) DEVICE — ALLENTOWN LAP CHOLE APP PACK: Brand: CARDINAL HEALTH

## (undated) DEVICE — ADHESIVE SKIN HIGH VISCOSITY EXOFIN 1ML

## (undated) DEVICE — ROBOT DRAPE INST ARM DA VINCI SI

## (undated) DEVICE — SUT VLOC PBT 0 GS-21 6 IN VLOCN0306

## (undated) DEVICE — SUT VICRYL 0 UR-6 27 IN J603H

## (undated) DEVICE — TOWEL SET X-RAY

## (undated) DEVICE — TOWEL SURG XR DETECT GREEN STRL RFD

## (undated) DEVICE — VIAL DECANTER

## (undated) DEVICE — GLOVE INDICATOR PI UNDERGLOVE SZ 8 BLUE

## (undated) DEVICE — CHLORAPREP HI-LITE 26ML ORANGE

## (undated) DEVICE — TIP COVER ACCESSORY

## (undated) DEVICE — DRAPE EQUIPMENT RF WAND

## (undated) DEVICE — TUBING SMOKE EVAC W/FILTRATION DEVICE PLUMEPORT ACTIV

## (undated) DEVICE — 40595 XL TRENDELENBURG POSITIONING KIT: Brand: 40595 XL TRENDELENBURG POSITIONING KIT

## (undated) DEVICE — GLOVE INDICATOR PI UNDERGLOVE SZ 8.5 BLUE

## (undated) DEVICE — PROGRASP FORCEPS: Brand: ENDOWRIST;DAVINCI SI

## (undated) DEVICE — LUBRICANT INST ELECTROLUBE ANTISTK WO PAD

## (undated) DEVICE — NEEDLE 25G X 1 1/2

## (undated) DEVICE — MEDI-VAC YANK SUCT HNDL W/TPRD BULBOUS TIP: Brand: CARDINAL HEALTH

## (undated) DEVICE — ROBOT CAMERA DRAPE ARM

## (undated) DEVICE — INTENDED FOR TISSUE SEPARATION, AND OTHER PROCEDURES THAT REQUIRE A SHARP SURGICAL BLADE TO PUNCTURE OR CUT.: Brand: BARD-PARKER SAFETY BLADES SIZE 11, STERILE

## (undated) DEVICE — PENCIL ELECTROSURG E-Z CLEAN -0035H

## (undated) DEVICE — TUBING INSUFFLATION SET ISO CONNECTOR

## (undated) DEVICE — TROCAR: Brand: KII FIOS FIRST ENTRY

## (undated) DEVICE — MEDI-VAC YANKAUER SUCTION HANDLE W/STRAIGHT TIP & CONTROL VENT: Brand: CARDINAL HEALTH

## (undated) DEVICE — PAD GROUNDING ADULT

## (undated) DEVICE — SURGICAL CLIPPER BLADE GENERAL USE

## (undated) DEVICE — ROBOT CAMERA DRAPE HEAD

## (undated) DEVICE — MEGASUTURECUT ND: Brand: ENDOWRIST;DAVINCI SI

## (undated) DEVICE — SUT VICRYL 2-0 CT-2 27 IN J269H

## (undated) DEVICE — TUBING SUCTION 5MM X 12 FT

## (undated) DEVICE — ABSORBABLE WOUND CLOSURE DEVICE: Brand: V-LOC 180

## (undated) DEVICE — MONOPOLAR CURVED SCISSORS: Brand: ENDOWRIST;DAVINCI SI

## (undated) DEVICE — SUT MONOCRYL 4-0 PS-2 18 IN Y496G

## (undated) DEVICE — Device

## (undated) DEVICE — VISUALIZATION SYSTEM: Brand: CLEARIFY

## (undated) DEVICE — SCD SEQUENTIAL COMPRESSION COMFORT SLEEVE MEDIUM KNEE LENGTH: Brand: KENDALL SCD

## (undated) DEVICE — GLOVE SRG BIOGEL 8

## (undated) DEVICE — ROBOT INST CANNULA 8MM OBTURATOR BLUNT DISP

## (undated) DEVICE — BAG DECANTER

## (undated) DEVICE — SUT VICRYL 0 REEL 54 IN J287G

## (undated) DEVICE — IV CATH 14 G X 3 1/4 IN